# Patient Record
Sex: FEMALE | Race: WHITE | NOT HISPANIC OR LATINO | URBAN - METROPOLITAN AREA
[De-identification: names, ages, dates, MRNs, and addresses within clinical notes are randomized per-mention and may not be internally consistent; named-entity substitution may affect disease eponyms.]

---

## 2017-03-22 ENCOUNTER — EMERGENCY (EMERGENCY)
Facility: HOSPITAL | Age: 82
LOS: 1 days | Discharge: HOME | End: 2017-03-22
Attending: EMERGENCY MEDICINE | Admitting: EMERGENCY MEDICINE
Payer: MEDICARE

## 2017-03-22 VITALS
RESPIRATION RATE: 20 BRPM | SYSTOLIC BLOOD PRESSURE: 150 MMHG | TEMPERATURE: 97 F | HEART RATE: 81 BPM | OXYGEN SATURATION: 95 % | DIASTOLIC BLOOD PRESSURE: 88 MMHG

## 2017-03-22 VITALS
OXYGEN SATURATION: 95 % | HEART RATE: 74 BPM | SYSTOLIC BLOOD PRESSURE: 152 MMHG | TEMPERATURE: 97 F | DIASTOLIC BLOOD PRESSURE: 99 MMHG | RESPIRATION RATE: 22 BRPM

## 2017-03-22 DIAGNOSIS — I63.9 CEREBRAL INFARCTION, UNSPECIFIED: ICD-10-CM

## 2017-03-22 DIAGNOSIS — Z87.891 PERSONAL HISTORY OF NICOTINE DEPENDENCE: ICD-10-CM

## 2017-03-22 DIAGNOSIS — Z95.1 PRESENCE OF AORTOCORONARY BYPASS GRAFT: Chronic | ICD-10-CM

## 2017-03-22 DIAGNOSIS — R53.1 WEAKNESS: ICD-10-CM

## 2017-03-22 DIAGNOSIS — I25.10 ATHEROSCLEROTIC HEART DISEASE OF NATIVE CORONARY ARTERY WITHOUT ANGINA PECTORIS: ICD-10-CM

## 2017-03-22 PROCEDURE — 81003 URINALYSIS AUTO W/O SCOPE: CPT

## 2017-03-22 PROCEDURE — 84484 ASSAY OF TROPONIN QUANT: CPT

## 2017-03-22 PROCEDURE — 36415 COLL VENOUS BLD VENIPUNCTURE: CPT

## 2017-03-22 PROCEDURE — 70450 CT HEAD/BRAIN W/O DYE: CPT

## 2017-03-22 PROCEDURE — 81001 URINALYSIS AUTO W/SCOPE: CPT

## 2017-03-22 PROCEDURE — 70496 CT ANGIOGRAPHY HEAD: CPT

## 2017-03-22 PROCEDURE — 70498 CT ANGIOGRAPHY NECK: CPT | Mod: 26

## 2017-03-22 PROCEDURE — 70450 CT HEAD/BRAIN W/O DYE: CPT | Mod: 26

## 2017-03-22 PROCEDURE — 80053 COMPREHEN METABOLIC PANEL: CPT

## 2017-03-22 PROCEDURE — 99284 EMERGENCY DEPT VISIT MOD MDM: CPT | Mod: 25

## 2017-03-22 PROCEDURE — 99284 EMERGENCY DEPT VISIT MOD MDM: CPT

## 2017-03-22 PROCEDURE — 85025 COMPLETE CBC W/AUTO DIFF WBC: CPT

## 2017-03-22 PROCEDURE — 70496 CT ANGIOGRAPHY HEAD: CPT | Mod: 26,59

## 2017-03-22 PROCEDURE — 70498 CT ANGIOGRAPHY NECK: CPT

## 2017-03-22 PROCEDURE — 93005 ELECTROCARDIOGRAM TRACING: CPT

## 2017-03-22 PROCEDURE — 87086 URINE CULTURE/COLONY COUNT: CPT

## 2017-03-22 PROCEDURE — 93010 ELECTROCARDIOGRAM REPORT: CPT

## 2017-03-22 PROCEDURE — 96374 THER/PROPH/DIAG INJ IV PUSH: CPT

## 2017-03-22 RX ORDER — CEFTRIAXONE 500 MG/1
1 INJECTION, POWDER, FOR SOLUTION INTRAMUSCULAR; INTRAVENOUS ONCE
Qty: 0 | Refills: 0 | Status: COMPLETED | OUTPATIENT
Start: 2017-03-22 | End: 2017-03-22

## 2017-03-22 RX ORDER — CEFUROXIME AXETIL 250 MG
250 TABLET ORAL
Qty: 3500 | Refills: 0 | OUTPATIENT
Start: 2017-03-22 | End: 2017-03-29

## 2017-03-22 RX ADMIN — CEFTRIAXONE 100 GRAM(S): 500 INJECTION, POWDER, FOR SOLUTION INTRAMUSCULAR; INTRAVENOUS at 17:34

## 2017-03-22 NOTE — ED ADULT NURSE NOTE - OBJECTIVE STATEMENT
pt complains of weakness and trouble producing speech yesterday afternoon. pt denies discomfort at present. pt pending providers eval. safety maintained.

## 2017-03-22 NOTE — ED PROVIDER NOTE - OBJECTIVE STATEMENT
88 yo woman with hx of CABG and CVA 9 yrs ago with no residual deficits presents with an episode of aphasia (now resolved) yesterday at 3pm and left-sided arm tingling and weakness since that time. Pt was visiting  at [place] when she had an episode of "not being able to get the words out". On further questioning, appeared to be aphasic and not dysarthric. " I knew what I wanted to say but couldn't think of the words." Issue lasted for unclear amount of time, but per patient 5 or 10 minutes; it resolved completely. However, pt describes a left arm weakness and tingling since along with a "blurriness behind" her left eye. Of note, pt reports that for at least a year she has had these left-sided symptoms along with a vague sensintermittently. Today, the symptoms are more pronounce 86 yo woman with hx of CABG and CVA 9 yrs ago with no residual deficits presents with an episode of aphasia (now resolved) yesterday at 3pm and left-sided arm tingling and weakness since that time. Pt was visiting  at [place] when she had an episode of "not being able to get the words out". On further questioning, appeared to be aphasic and not dysarthric. " I knew what I wanted to say but couldn't think of the words." Issue lasted for unclear amount of time, but per patient 5 or 10 minutes; it resolved completely. However, pt describes a left arm weakness and tingling since along with a "blurriness behind" her left eye. Of note, pt reports that for at least a year she has intermittently had these left-sided symptoms along with a vague sensation/pressure in her head. Today, the symptoms are more pronounced and not going away. Pt denies confusion, dizziness, lightheadedness, dysphagia, dysarthria, F/C, N/V, CP, SOB, Abd pain, change in bowels. Pt does report an itchiness/pain in her genital area for last few days. Pt had a "cold" 2 weeks ago and finished 10 days of amoxicillin 2 days ago, with resolution of symptoms. Her stroke 10 years ago occurred during a hospitalization for CABG; pt was blind in both eyes but symptoms resolved during the hospital stay.

## 2017-03-22 NOTE — ED ADULT NURSE REASSESSMENT NOTE - NS ED NURSE REASSESS COMMENT FT1
back from CT, pending results.
Patient d/c, nurse note incomplete from previous shift.
Received patient from SAMMIE Elmore, nurse note incomplete.

## 2017-03-22 NOTE — ED ADULT TRIAGE NOTE - CHIEF COMPLAINT QUOTE
patient reports "yesterday when I went to visit my  I couldn't talk and express what I wanted to say and since then my whole left side has been weak and my left eye is bothering me." Left hand  noted to be weaker than right side. Speech is coherent and speaking in full sentences.

## 2017-03-22 NOTE — ED PROVIDER NOTE - ATTENDING CONTRIBUTION TO CARE
86 yo female h/o cad s/p cabg, cva w/o residual deficits c/o short episode of expressive aphasia 3 d ago and feeling mild LUE numbness/weakness w/ mild ha and possible blurred vision.  No fever, uri sx, head trauma, cp, palpitations, sob, n/v.  No change in speech now, no difficulty walking.  H/o similar LUE sx in the past.  Well appearing, nc/at, eomi, perrl, op benign, mmm, lung cta, heart reg, abd soft/nt, ext no c/c/e, cn grossly intact, motor 5/5, no gross sens deficits, no pronator drift, fnf intact, nl gait, neg romberg.  Stroke consulted for ? subacute tia vs cva - ct head neg, labs/ekg wnl except for uti.  Stroke team felt sx likely exacerbation of old stroke 2/2 infection w uti and recommend cta head/neck - cleared for dc and outpt neuro f/u if neg.  CTA neg.  Pt dc to f/u pmd, neuro w rx for uti.

## 2017-03-22 NOTE — ED PROVIDER NOTE - MEDICAL DECISION MAKING DETAILS
After consultation with Stroke Team, symptoms likely recurrence of old stroke symptoms in the setting of recent URI w/ fevers and current UTI. Due to the nature of the symptoms and with negative imaging, new CVA is unlikely. Pt already has plan to follow up with Dr. Calero from Stroke Team.

## 2017-03-22 NOTE — CONSULT NOTE ADULT - SUBJECTIVE AND OBJECTIVE BOX
Vascular Neurology Consultation    Reason for consult: transient expressive aphasia yesterday and left arm/leg weakness    HPI:      PAST MEDICAL & SURGICAL HISTORY:  Stroke  CAD (coronary artery disease)  S/P CABG (coronary artery bypass graft)    FAMILY HISTORY:  No pertinent family history    Social History: Lives alone.  currently a resident of [facility]. Does not require assistive devices to ambulate. Independent of all ADLs but does require some help with heavier chores at home. Does not have an aid, but daughter comes frequently to assist. An active munoz. Former smoker, quit 35-40 years ago, smoked approximately 2 ppd x 20 years. Social ETOH use- approximately 3-4 drinks a week. No illicit drug use.     Home meds:   Alprazolam   Plavix   Aspirin 81 mg daily  Metoprolol   Protonix  Atorvastatin  Fenofibrate   Reports compliance with meds     Review of Systems:  Constitutional: No fever, weight loss or fatigue  Eyes: No eye pain or discharge  ENMT:  No difficulty hearing, tinnitus, vertigo; No sinus or throat pain  Neck: Chronic left and right neck pain, intermittent   Respiratory: No cough, wheezing, chills or hemoptysis. Recent URI as in HPI   Cardiovascular: No chest pain, dizziness or leg swelling. Intermittent palpitations and shortness of breath, often related to anxiety, resolves with rest and alprazolam.   Gastrointestinal: No abdominal pain. No nausea, vomiting or hematemesis; No diarrhea or constipation.  Genitourinary: No dysuria, frequency, hematuria or incontinence  Neurological: As per HPI  Skin: No itching, burning, rashes or lesions   Endocrine: No heat or cold intolerance; No hair loss  Musculoskeletal: No joint pain or swelling  Psychiatric: No depression, mood swings or difficulty sleeping  Heme/Lymph: No easy bruising or bleeding gums    MEDICATIONS  (STANDING):    MEDICATIONS  (PRN):      Allergies: No Known Allergies    Vital Signs Last 24 Hrs  T(C): 36.2, Max: 36.2 (03-22 @ 13:25)  T(F): 97.1, Max: 97.1 (03-22 @ 13:25)  HR: 74 (74 - 74)  BP: 152/99 (152/99 - 152/99)  RR: 22 (22 - 22)  SpO2: 95% (95% - 95%)    Gen: No acute distress, well-nourished, calm, cooperative, pleasant  Neuro:  Mental status: Awake, alert and oriented x3.  Recent and remote memory intact.  Naming, repetition and comprehension intact.  Attention/concentration intact.  No dysarthria, no aphasia.  Fund of knowledge appropriate.    Cranial nerves:  pupils equally round and sluggishly reactive to light, 2 mm, visual fields full, no nystagmus, no ptosis, extraocular muscles intact, V1 through V3 intact bilaterally and symmetric, mild flattening of left nasolabial fold, chronic hard of hearing-wears hearing aids, palate elevation symmetric, tongue was midline, sternocleidomastoid/shoulder shrug strength bilaterally 5/5.    Motor: No upper or lower extremity drift. Normal bulk and tone. Bilateral  strength equally strong. RUE 5/5. LUE 5/5. RLE 5/5. LLE 5/5. Rapid alternating movements intact and symmetric.   Sensation: Intact to light touch, proprioception. Decreased vibration on RUE and LLE. Decreased temperature RUE and LLE.  No neglect.   Coordination: No dysmetria on finger-to-nose and heel-to-shin.  No clumsiness.  Reflexes: 2+ in upper and lower extremities, absent Babinski bilaterally  Gait: Narrow and steady. No ataxia.  Romberg negative    NIHSS: 2 (1-facial asymmetry, 1-mild sensory loss)     Labs:                        13.7   5.9   )-----------( 276      ( 22 Mar 2017 14:40 )             40.4     22 Mar 2017 14:40    141    |  108    |  21     ----------------------------<  81     4.2     |  23     |  0.70     Ca    8.9        22 Mar 2017 14:40    TPro  7.0    /  Alb  3.4    /  TBili  0.3    /  DBili  x      /  AST  15     /  ALT  21     /  AlkPhos  107    22 Mar 2017 14:40      Radiology and Additional Studies:  3/22 CT head:   IMPRESSION:  Remote left occipital infarct. No evidence of acute infarct.    Assessment & Plan: Vascular Neurology Consultation    Reason for consult: transient expressive aphasia yesterday and left arm/leg weakness    HPI: 87 year old right handed woman with PMH of anxiety, CAD, CABG 9 years ago, CVA post-CABG, cervical and spinal stenosis, recent URI w/ fevers 2 weeks ago s/p 10 days of Amoxicillin, presents today with transient expressive aphasia yesterday and left arm/leg weakness. Of note, old CVA presentation consisted of bilateral eye blindness (resolved after 1 day, resolved during her hospital admission) and left arm/leg weakness. Since then, pt has had intermittent left arm and leg weakness and also with intermittent left eye blurry vision. Was seen by multiple opthalmology specialist with normal exams. Yesterday, pt was in [facility], was visiting her  at [facility] at approximately 2-3 pm, was in the midst of talking to her  and suddenly had trouble getting words out. Knew what she wanted to say, but had difficulty expressing herself. Lasted approximately 5 minutes and then resolved on its own. At the time, pt also noted her left arm/leg weakness returned-weakness similar to her previous episodes. Also with intermittent left eye blurry vision-similar to previous episodes as well. However, previous episodes did not involve aphasia or slurred speech. Daughter reports pt had similar episode last week, but when asked pt to clarify symptoms last week, pt states she does not recall. Decided to come into [facility] ED to get evaluated today under persuasion by her daughter given new onset of transient expressive aphasia. Pt denies dizziness, confusion, headache, numbness, right sided weakness, gait imbalance at the time.     Reports recent vaginal itching and "feeling swollen down there." No dysuria, no frequency, no hesitancy. Denies being sexually active. Itching after she started her antibiotics for her recent URI.     Has had chronic neck pain, more prominent on left than right. Has had imaging before which revealed cervical and spinal stenosis. Currently with mild left sided neck pain for which pt states is the same characteristics as her prior pain episodes.     Upon arrival to ED, VS : /99, HR 74, Temp 97.1, RR 22, SpO2 95%. UA positive for UTI. Given Ceftriaxone 1 gm in the ED.     PAST MEDICAL & SURGICAL HISTORY:  Anxiety  Stroke post-CABG  CAD (coronary artery disease)  S/P CABG (coronary artery bypass graft)  Cervical and spinal stenosis     FAMILY HISTORY:  No pertinent family history    Social History: Lives alone. 94 year old  is a long term resident of [facility]-pt states she visits him frequently but he gives her a lot of stress. Does not require assistive devices to ambulate. Independent of all ADLs but does require some help with heavier chores at home. Does not have an aid, but daughter comes frequently to assist. 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 Former smoker, quit 35-40 years ago, smoked approximately 2 ppd x 20 years. Social ETOH use- approximately 3-4 drinks a week. No illicit drug use.     Home meds:   Alprazolam (unknown dose)  Plavix 75 mg daily  Aspirin 81 mg daily  Metoprolol (unknown dose)  Protonix (unknown dose)  Atorvastatin (unknown dose)  Fenofibrate (unknown dose)   Reports compliance with meds     Review of Systems:  Constitutional: No fever, weight loss or fatigue  Eyes: No eye pain or discharge  ENMT:  No difficulty hearing, tinnitus, vertigo; No sinus or throat pain  Neck: Chronic left and right neck pain, intermittent   Respiratory: No cough, wheezing, chills or hemoptysis. Recent URI as in HPI   Cardiovascular: No chest pain, dizziness or leg swelling. Intermittent palpitations and shortness of breath, often related to anxiety, resolves with rest and alprazolam.   Gastrointestinal: No abdominal pain. No nausea, vomiting or hematemesis; No diarrhea or constipation.  Genitourinary: No dysuria, frequency, hematuria or incontinence  Neurological: As per HPI  Skin: No itching, burning, rashes or lesions   Endocrine: No heat or cold intolerance; No hair loss  Musculoskeletal: No joint pain or swelling  Psychiatric: No depression, mood swings or difficulty sleeping  Heme/Lymph: No easy bruising or bleeding gums    MEDICATIONS  (STANDING):    MEDICATIONS  (PRN):      Allergies: No Known Allergies    Vital Signs Last 24 Hrs  T(C): 36.2, Max: 36.2 (03-22 @ 13:25)  T(F): 97.1, Max: 97.1 (03-22 @ 13:25)  HR: 74 (74 - 74)  BP: 152/99 (152/99 - 152/99)  RR: 22 (22 - 22)  SpO2: 95% (95% - 95%)    Gen: No acute distress, well-nourished, calm, cooperative, pleasant  Neuro:  Mental status: Awake, alert and oriented x3.  Recent and remote memory intact. Word recall 3/3. Naming, repetition and comprehension intact.  Attention/concentration intact.  No dysarthria, no aphasia.  Fund of knowledge appropriate.    Cranial nerves:  pupils equally round and sluggishly reactive to light, 2 mm, visual fields full, no nystagmus, no ptosis, extraocular muscles intact, V1 through V3 intact bilaterally and symmetric, mild flattening of left nasolabial fold (daughter states this is pt's baseline), chronic hard of hearing-wears hearing aids, palate elevation symmetric, tongue was midline, sternocleidomastoid/shoulder shrug strength bilaterally 5/5.    Motor: No upper or lower extremity drift. Normal bulk and tone. Bilateral  strength equally strong. RUE 5/5. LUE 5/5. RLE 5/5. LLE 5/5. Rapid alternating movements intact and symmetric.   Sensation: Decreased light touch, pinprick, vibration and temperature on LUE and LLE (pt reports it is chronic).  No neglect.   Coordination: No dysmetria on finger-to-nose and heel-to-shin.  No clumsiness.  Reflexes: 2+ in upper and lower extremities, absent Babinski bilaterally  Gait: Narrow and steady. No ataxia.  Romberg negative    NIHSS: 2 (1-facial asymmetry which is chronic as per daughter, 1-mild sensory loss which is chronic as per pt)   Not a tpa candidate given resolution of acute symptoms; above symptoms are chronic as per pt and daughter    Labs:                        13.7   5.9   )-----------( 276      ( 22 Mar 2017 14:40 )             40.4     22 Mar 2017 14:40    141    |  108    |  21     ----------------------------<  81     4.2     |  23     |  0.70     Ca    8.9        22 Mar 2017 14:40    TPro  7.0    /  Alb  3.4    /  TBili  0.3    /  DBili  x      /  AST  15     /  ALT  21     /  AlkPhos  107    22 Mar 2017 14:40      Radiology and Additional Studies:  3/22 CT head:   IMPRESSION:  Remote left occipital infarct. No evidence of acute infarct.    Assessment & Plan:  87 year old right handed woman with PMH of anxiety, CAD, CABG 9 years ago, CVA post-CABG (with intermittent left arm/leg weakness and left eye blurriness), cervical and spinal stenosis, recent URI w/ fevers 2 weeks ago s/p 10 days of Amoxicillin, presents today with new onset of transient expressive aphasia that occurred yesterday. Found to have UTI.     -CT head with chronic left occipital infarct  -would check CTA head/neck w/ IV contrast to assess for stenosis or occlusion  -if CTA unremarkable, ok to discharge home with follow up with Dr. Calero in 1 week as an outpatient for routine neurological care; pt reports she has not followed up with any neurologist after her CVA 9 years ago  -likely recurrence of old stroke symptoms in the setting of recent URI w/ fevers, recent stress from her , and current UTI   -discharge on antibiotics for UTI  -continue all current home meds with exception of protonix; counseled pt and daughter that protonix interacts with plavix and should be avoided; should also avoid nexium. Ok to take OTC pepcid or zantac. Pt reports understanding  -pt advised to return to the ED if another episode of expressive aphasia or slurred speech Vascular Neurology Consultation    Reason for consult: transient expressive aphasia yesterday and left arm/leg weakness    HPI: 87 year old right handed woman with PMH of anxiety, CAD, CABG 9 years ago, CVA post-CABG, cervical and spinal stenosis, recent URI w/ fevers 2 weeks ago s/p 10 days of Amoxicillin, presents today with transient expressive aphasia yesterday and left arm/leg weakness. Of note, old CVA presentation consisted of bilateral eye blindness (resolved after 1 day, resolved during her hospital admission) and left arm/leg weakness. Since then, pt has had intermittent left arm and leg weakness and also with intermittent left eye blurry vision. Was seen by multiple opthalmology specialist with normal exams. Yesterday, pt was in [facility] [place], was visiting her  at [facility] at approximately 2-3 pm, was in the midst of talking to her  and suddenly had trouble getting words out. Knew what she wanted to say, but had difficulty expressing herself. Lasted approximately 5 minutes and then resolved on its own. At the time, pt also noted her left arm/leg weakness returned-weakness similar to her previous episodes. Also with intermittent left eye blurry vision-similar to previous episodes as well. However, previous episodes did not involve aphasia or slurred speech. Daughter reports pt had similar episode last week, but when asked pt to clarify symptoms last week, pt states she does not recall. Decided to come into [facility] ED to get evaluated today under persuasion by her daughter given new onset of transient expressive aphasia. Pt denies dizziness, confusion, headache, numbness, right sided weakness, gait imbalance at the time.     Reports recent vaginal itching and "feeling swollen down there." No dysuria, no frequency, no hesitancy. Denies being sexually active. Itching after she started her antibiotics for her recent URI.     Has had chronic neck pain, more prominent on left than right. Has had imaging before which revealed cervical and spinal stenosis. Currently with mild left sided neck pain for which pt states is the same characteristics as her prior pain episodes.     Upon arrival to ED, VS : /99, HR 74, Temp 97.1, RR 22, SpO2 95%. UA positive for UTI. Given Ceftriaxone 1 gm in the ED.     PAST MEDICAL & SURGICAL HISTORY:  Anxiety  Stroke post-CABG  CAD (coronary artery disease)  S/P CABG (coronary artery bypass graft)  Cervical and spinal stenosis     FAMILY HISTORY:  No pertinent family history    Social History: Lives alone. 94 year old  is a long term resident of [facility]-pt states she visits him frequently but he gives her a lot of stress. Does not require assistive devices to ambulate. Independent of all ADLs but does require some help with heavier chores at home. Does not have an aid, but daughter comes frequently to assist. 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 Former smoker, quit 35-40 years ago, smoked approximately 2 ppd x 20 years. Social ETOH use- approximately 3-4 drinks a week. No illicit drug use.     Home meds:   Alprazolam (unknown dose)  Plavix 75 mg daily  Aspirin 81 mg daily  Metoprolol (unknown dose)  Protonix (unknown dose)  Atorvastatin (unknown dose)  Fenofibrate (unknown dose)   Reports compliance with meds     Review of Systems:  Constitutional: No fever, weight loss or fatigue  Eyes: No eye pain or discharge  ENMT:  No difficulty hearing, tinnitus, vertigo; No sinus or throat pain  Neck: Chronic left and right neck pain, intermittent   Respiratory: No cough, wheezing, chills or hemoptysis. Recent URI as in HPI   Cardiovascular: No chest pain, dizziness or leg swelling. Intermittent palpitations and shortness of breath, often related to anxiety, resolves with rest and alprazolam.   Gastrointestinal: No abdominal pain. No nausea, vomiting or hematemesis; No diarrhea or constipation.  Genitourinary: No dysuria, frequency, hematuria or incontinence  Neurological: As per HPI  Skin: No itching, burning, rashes or lesions   Endocrine: No heat or cold intolerance; No hair loss  Musculoskeletal: No joint pain or swelling  Psychiatric: No depression, mood swings or difficulty sleeping  Heme/Lymph: No easy bruising or bleeding gums    MEDICATIONS  (STANDING):    MEDICATIONS  (PRN):      Allergies: No Known Allergies    Vital Signs Last 24 Hrs  T(C): 36.2, Max: 36.2 (03-22 @ 13:25)  T(F): 97.1, Max: 97.1 (03-22 @ 13:25)  HR: 74 (74 - 74)  BP: 152/99 (152/99 - 152/99)  RR: 22 (22 - 22)  SpO2: 95% (95% - 95%)    Gen: No acute distress, well-nourished, calm, cooperative, pleasant  Neuro:  Mental status: Awake, alert and oriented x3.  Recent and remote memory intact. Word recall 3/3. Naming, repetition and comprehension intact.  Attention/concentration intact.  No dysarthria, no aphasia.  Fund of knowledge appropriate.    Cranial nerves:  pupils equally round and sluggishly reactive to light, 2 mm, visual fields full, no nystagmus, no ptosis, extraocular muscles intact, V1 through V3 intact bilaterally and symmetric, mild flattening of left nasolabial fold (daughter states this is pt's baseline), chronic hard of hearing-wears hearing aids, palate elevation symmetric, tongue was midline, sternocleidomastoid/shoulder shrug strength bilaterally 5/5.    Motor: No upper or lower extremity drift. Normal bulk and tone. Bilateral  strength equally strong. RUE 5/5. LUE 5/5. RLE 5/5. LLE 5/5. Rapid alternating movements intact and symmetric.   Sensation: Decreased light touch, pinprick, vibration and temperature on LUE and LLE (pt reports it is chronic).  No neglect.   Coordination: No dysmetria on finger-to-nose and heel-to-shin.  No clumsiness.  Reflexes: 2+ in upper and lower extremities, absent Babinski bilaterally  Gait: Narrow and steady. No ataxia.  Romberg negative    NIHSS: 2 (1-facial asymmetry which is chronic as per daughter, 1-mild sensory loss which is chronic as per pt)   Not a tpa candidate given resolution of acute symptoms; above symptoms are chronic as per pt and daughter    Labs:                        13.7   5.9   )-----------( 276      ( 22 Mar 2017 14:40 )             40.4     22 Mar 2017 14:40    141    |  108    |  21     ----------------------------<  81     4.2     |  23     |  0.70     Ca    8.9        22 Mar 2017 14:40    TPro  7.0    /  Alb  3.4    /  TBili  0.3    /  DBili  x      /  AST  15     /  ALT  21     /  AlkPhos  107    22 Mar 2017 14:40      Radiology and Additional Studies:  3/22 CT head:   IMPRESSION:  Remote left occipital infarct. No evidence of acute infarct.    Assessment & Plan:  87 year old right handed woman with PMH of anxiety, CAD, CABG 9 years ago, CVA post-CABG (with intermittent left arm/leg weakness and left eye blurriness), cervical and spinal stenosis, recent URI w/ fevers 2 weeks ago s/p 10 days of Amoxicillin, presents today with new onset of transient expressive aphasia that occurred yesterday.  Also complains of persistent left sided weakness similar to past episodes though no objective weakness on exam.  Found to have UTI.     -CT head with chronic left occipital infarct  -would check CTA head/neck w/ IV contrast to assess for stenosis or occlusion  -if CTA with no significant findings, ok to discharge home with follow up with Dr. Calero in 1 week as an outpatient for routine neurological care; pt reports she has not followed up with any neurologist after her CVA 9 years ago  -likely recurrence of old stroke symptoms in the setting of recent URI w/ fevers, recent stress from her , and current UTI   -discharge on antibiotics for UTI  -continue all current home meds.  Stop nexium since also taking clopidogrel.  Ok to take OTC pepcid or zantac. Pt reports understanding  -pt advised to return to the ED if another episode of expressive aphasia or slurred speech

## 2017-03-23 RX ORDER — CEFUROXIME AXETIL 250 MG
1 TABLET ORAL
Qty: 0 | Refills: 0 | COMMUNITY
Start: 2017-03-23 | End: 2017-03-29

## 2017-12-05 PROBLEM — Z00.00 ENCOUNTER FOR PREVENTIVE HEALTH EXAMINATION: Status: ACTIVE | Noted: 2017-12-05

## 2018-01-10 ENCOUNTER — APPOINTMENT (OUTPATIENT)
Dept: OPHTHALMOLOGY | Facility: CLINIC | Age: 83
End: 2018-01-10
Payer: MEDICARE

## 2018-01-10 PROCEDURE — 92014 COMPRE OPH EXAM EST PT 1/>: CPT

## 2018-05-11 NOTE — ED ADULT NURSE REASSESSMENT NOTE - RESPIRATORY WDL
Breathing spontaneous and unlabored. Breath sounds clear and equal bilaterally with regular rhythm. no

## 2018-06-18 ENCOUNTER — EMERGENCY (EMERGENCY)
Facility: HOSPITAL | Age: 83
LOS: 1 days | Discharge: HOME | End: 2018-06-18
Attending: EMERGENCY MEDICINE | Admitting: EMERGENCY MEDICINE
Payer: MEDICARE

## 2018-06-18 VITALS
RESPIRATION RATE: 22 BRPM | TEMPERATURE: 98 F | DIASTOLIC BLOOD PRESSURE: 63 MMHG | HEART RATE: 81 BPM | SYSTOLIC BLOOD PRESSURE: 97 MMHG | WEIGHT: 129.19 LBS | OXYGEN SATURATION: 96 %

## 2018-06-18 VITALS
SYSTOLIC BLOOD PRESSURE: 135 MMHG | RESPIRATION RATE: 22 BRPM | DIASTOLIC BLOOD PRESSURE: 65 MMHG | HEART RATE: 60 BPM | OXYGEN SATURATION: 98 %

## 2018-06-18 DIAGNOSIS — E78.5 HYPERLIPIDEMIA, UNSPECIFIED: ICD-10-CM

## 2018-06-18 DIAGNOSIS — I25.10 ATHEROSCLEROTIC HEART DISEASE OF NATIVE CORONARY ARTERY WITHOUT ANGINA PECTORIS: ICD-10-CM

## 2018-06-18 DIAGNOSIS — Z79.2 LONG TERM (CURRENT) USE OF ANTIBIOTICS: ICD-10-CM

## 2018-06-18 DIAGNOSIS — Z95.1 PRESENCE OF AORTOCORONARY BYPASS GRAFT: Chronic | ICD-10-CM

## 2018-06-18 DIAGNOSIS — I10 ESSENTIAL (PRIMARY) HYPERTENSION: ICD-10-CM

## 2018-06-18 DIAGNOSIS — R07.9 CHEST PAIN, UNSPECIFIED: ICD-10-CM

## 2018-06-18 DIAGNOSIS — R07.89 OTHER CHEST PAIN: ICD-10-CM

## 2018-06-18 LAB
ALBUMIN SERPL ELPH-MCNC: 3.8 G/DL — SIGNIFICANT CHANGE UP (ref 3.3–5)
ALP SERPL-CCNC: 95 U/L — SIGNIFICANT CHANGE UP (ref 40–120)
ALT FLD-CCNC: 22 U/L — SIGNIFICANT CHANGE UP (ref 10–45)
ANION GAP SERPL CALC-SCNC: 14 MMOL/L — SIGNIFICANT CHANGE UP (ref 5–17)
AST SERPL-CCNC: 24 U/L — SIGNIFICANT CHANGE UP (ref 10–40)
BASOPHILS NFR BLD AUTO: 0.4 % — SIGNIFICANT CHANGE UP (ref 0–2)
BILIRUB SERPL-MCNC: 0.4 MG/DL — SIGNIFICANT CHANGE UP (ref 0.2–1.2)
BUN SERPL-MCNC: 20 MG/DL — SIGNIFICANT CHANGE UP (ref 7–23)
CALCIUM SERPL-MCNC: 8.9 MG/DL — SIGNIFICANT CHANGE UP (ref 8.4–10.5)
CHLORIDE SERPL-SCNC: 106 MMOL/L — SIGNIFICANT CHANGE UP (ref 96–108)
CK MB CFR SERPL CALC: 2 NG/ML — SIGNIFICANT CHANGE UP (ref 0–6.7)
CK SERPL-CCNC: 88 U/L — SIGNIFICANT CHANGE UP (ref 25–170)
CO2 SERPL-SCNC: 23 MMOL/L — SIGNIFICANT CHANGE UP (ref 22–31)
CREAT SERPL-MCNC: 0.75 MG/DL — SIGNIFICANT CHANGE UP (ref 0.5–1.3)
EOSINOPHIL NFR BLD AUTO: 4 % — SIGNIFICANT CHANGE UP (ref 0–6)
GLUCOSE SERPL-MCNC: 109 MG/DL — HIGH (ref 70–99)
HCT VFR BLD CALC: 40 % — SIGNIFICANT CHANGE UP (ref 34.5–45)
HGB BLD-MCNC: 13.1 G/DL — SIGNIFICANT CHANGE UP (ref 11.5–15.5)
LYMPHOCYTES # BLD AUTO: 30.8 % — SIGNIFICANT CHANGE UP (ref 13–44)
MCHC RBC-ENTMCNC: 31.2 PG — SIGNIFICANT CHANGE UP (ref 27–34)
MCHC RBC-ENTMCNC: 32.8 G/DL — SIGNIFICANT CHANGE UP (ref 32–36)
MCV RBC AUTO: 95.2 FL — SIGNIFICANT CHANGE UP (ref 80–100)
MONOCYTES NFR BLD AUTO: 10.3 % — SIGNIFICANT CHANGE UP (ref 2–14)
NEUTROPHILS NFR BLD AUTO: 54.5 % — SIGNIFICANT CHANGE UP (ref 43–77)
PLATELET # BLD AUTO: 258 K/UL — SIGNIFICANT CHANGE UP (ref 150–400)
POTASSIUM SERPL-MCNC: 4.3 MMOL/L — SIGNIFICANT CHANGE UP (ref 3.5–5.3)
POTASSIUM SERPL-SCNC: 4.3 MMOL/L — SIGNIFICANT CHANGE UP (ref 3.5–5.3)
PROT SERPL-MCNC: 6.6 G/DL — SIGNIFICANT CHANGE UP (ref 6–8.3)
RBC # BLD: 4.2 M/UL — SIGNIFICANT CHANGE UP (ref 3.8–5.2)
RBC # FLD: 14.3 % — SIGNIFICANT CHANGE UP (ref 10.3–16.9)
SODIUM SERPL-SCNC: 143 MMOL/L — SIGNIFICANT CHANGE UP (ref 135–145)
TROPONIN T SERPL-MCNC: <0.01 NG/ML — SIGNIFICANT CHANGE UP (ref 0–0.01)
TROPONIN T SERPL-MCNC: <0.01 NG/ML — SIGNIFICANT CHANGE UP (ref 0–0.01)
WBC # BLD: 6.7 K/UL — SIGNIFICANT CHANGE UP (ref 3.8–10.5)
WBC # FLD AUTO: 6.7 K/UL — SIGNIFICANT CHANGE UP (ref 3.8–10.5)

## 2018-06-18 PROCEDURE — 36415 COLL VENOUS BLD VENIPUNCTURE: CPT

## 2018-06-18 PROCEDURE — 84484 ASSAY OF TROPONIN QUANT: CPT

## 2018-06-18 PROCEDURE — 93010 ELECTROCARDIOGRAM REPORT: CPT

## 2018-06-18 PROCEDURE — 82553 CREATINE MB FRACTION: CPT

## 2018-06-18 PROCEDURE — 99285 EMERGENCY DEPT VISIT HI MDM: CPT | Mod: 25

## 2018-06-18 PROCEDURE — 71046 X-RAY EXAM CHEST 2 VIEWS: CPT | Mod: 26

## 2018-06-18 PROCEDURE — 93005 ELECTROCARDIOGRAM TRACING: CPT

## 2018-06-18 PROCEDURE — 71046 X-RAY EXAM CHEST 2 VIEWS: CPT

## 2018-06-18 PROCEDURE — 80053 COMPREHEN METABOLIC PANEL: CPT

## 2018-06-18 PROCEDURE — 85025 COMPLETE CBC W/AUTO DIFF WBC: CPT

## 2018-06-18 PROCEDURE — 99283 EMERGENCY DEPT VISIT LOW MDM: CPT | Mod: 25

## 2018-06-18 PROCEDURE — 82550 ASSAY OF CK (CPK): CPT

## 2018-06-18 RX ORDER — OXYCODONE AND ACETAMINOPHEN 5; 325 MG/1; MG/1
1 TABLET ORAL ONCE
Qty: 0 | Refills: 0 | Status: DISCONTINUED | OUTPATIENT
Start: 2018-06-18 | End: 2018-06-18

## 2018-06-18 RX ORDER — ASPIRIN/CALCIUM CARB/MAGNESIUM 324 MG
325 TABLET ORAL ONCE
Qty: 0 | Refills: 0 | Status: COMPLETED | OUTPATIENT
Start: 2018-06-18 | End: 2018-06-18

## 2018-06-18 RX ADMIN — OXYCODONE AND ACETAMINOPHEN 1 TABLET(S): 5; 325 TABLET ORAL at 16:41

## 2018-06-18 NOTE — ED PROVIDER NOTE - OBJECTIVE STATEMENT
88 y/o f hx HTN, HLD, CAD s/p CABG 10 yrs ago presents c/o several episodes of chest tightness this week.  Pt stating episodes occurring at rest, had another this morning.  Pt stating there is no pain, stating the feeling is difficult to describe although not the same as when she had CABG previously. 89 y/o f hx HTN, HLD, CAD s/p CABG 10 yrs ago presents c/o several episodes of chest tightness this week.  Pt stating episodes occurring at rest, had another this morning.  Pt stating there is no pain, stating the feeling is difficult to describe although not the same as when she had CABG previously.  Pt stating in ED, having no discomfort.  Pt denies SOB, fever, chills, n/v, all other ROS negative.

## 2018-06-18 NOTE — ED PROVIDER NOTE - ATTENDING CONTRIBUTION TO CARE
88 y/o f hx HTN, HLD, CAD s/p CABG 10 yrs ago presents c/o several episodes of intermittent chest tightness this week. No CP in ED currently. No abd pain, back pain, cough. No SOB. Pt AAO, NAD, RRR, CTA b/l, abd: soft/NT. EKG unchanged and nonischemic, CXR negative and trop neg x 2.  Discussed with cardiology on call Dr. Koroma, since pt asymptomatic and neg trop and no EKG changes, will have her f/u in office tomorrow.  Pt agrees with plan, if sx return prior to her f/u, she is advised to return to ED. Stable for dc.

## 2018-06-18 NOTE — ED PROVIDER NOTE - MEDICAL DECISION MAKING DETAILS
88 y/o f hx HTN, HLD, CAD s/p CABG 10 yrs ago presents with intermittent chest tightness x 1 week; asymptomatic in ED, EKG unchanged and nonischemic, cxr negative, trop neg x 2.  Discussed with cardiology on call Dr. Koroma, since pt asymptomatic and neg trop and no EKG changes, will have her f/u in office tomorrow.  Pt agrees with plan, if sx return prior to her f/u, she is advised to return to ED.

## 2018-06-27 ENCOUNTER — APPOINTMENT (OUTPATIENT)
Dept: HEART AND VASCULAR | Facility: CLINIC | Age: 83
End: 2018-06-27
Payer: MEDICARE

## 2018-06-27 VITALS
OXYGEN SATURATION: 98 % | HEIGHT: 57.48 IN | TEMPERATURE: 96.9 F | SYSTOLIC BLOOD PRESSURE: 128 MMHG | DIASTOLIC BLOOD PRESSURE: 63 MMHG | WEIGHT: 126.99 LBS | HEART RATE: 55 BPM | BODY MASS INDEX: 27.02 KG/M2

## 2018-06-27 DIAGNOSIS — Z82.3 FAMILY HISTORY OF STROKE: ICD-10-CM

## 2018-06-27 DIAGNOSIS — Z86.39 PERSONAL HISTORY OF OTHER ENDOCRINE, NUTRITIONAL AND METABOLIC DISEASE: ICD-10-CM

## 2018-06-27 DIAGNOSIS — R06.02 SHORTNESS OF BREATH: ICD-10-CM

## 2018-06-27 DIAGNOSIS — R07.9 CHEST PAIN, UNSPECIFIED: ICD-10-CM

## 2018-06-27 DIAGNOSIS — Z87.891 PERSONAL HISTORY OF NICOTINE DEPENDENCE: ICD-10-CM

## 2018-06-27 DIAGNOSIS — Z95.1 PRESENCE OF AORTOCORONARY BYPASS GRAFT: ICD-10-CM

## 2018-06-27 DIAGNOSIS — Z82.49 FAMILY HISTORY OF ISCHEMIC HEART DISEASE AND OTHER DISEASES OF THE CIRCULATORY SYSTEM: ICD-10-CM

## 2018-06-27 DIAGNOSIS — Z80.9 FAMILY HISTORY OF MALIGNANT NEOPLASM, UNSPECIFIED: ICD-10-CM

## 2018-06-27 DIAGNOSIS — Z83.3 FAMILY HISTORY OF DIABETES MELLITUS: ICD-10-CM

## 2018-06-27 DIAGNOSIS — Z86.79 PERSONAL HISTORY OF OTHER DISEASES OF THE CIRCULATORY SYSTEM: ICD-10-CM

## 2018-06-27 DIAGNOSIS — Z83.49 FAMILY HISTORY OF OTHER ENDOCRINE, NUTRITIONAL AND METABOLIC DISEASES: ICD-10-CM

## 2018-06-27 DIAGNOSIS — Z78.9 OTHER SPECIFIED HEALTH STATUS: ICD-10-CM

## 2018-06-27 PROCEDURE — 93000 ELECTROCARDIOGRAM COMPLETE: CPT

## 2018-06-27 PROCEDURE — 99205 OFFICE O/P NEW HI 60 MIN: CPT

## 2018-06-27 RX ORDER — ALPRAZOLAM 2 MG/1
2 TABLET ORAL
Refills: 0 | Status: ACTIVE | COMMUNITY

## 2018-06-27 RX ORDER — HYDROCODONE BITARTRATE AND ACETAMINOPHEN 7.5; 325 MG/1; MG/1
7.5-325 TABLET ORAL
Qty: 30 | Refills: 0 | Status: ACTIVE | COMMUNITY
Start: 2018-06-05

## 2018-06-27 RX ORDER — NITROGLYCERIN 0.4 MG/1
0.4 TABLET SUBLINGUAL
Qty: 100 | Refills: 0 | Status: ACTIVE | COMMUNITY
Start: 2018-06-20

## 2018-06-27 RX ORDER — ATORVASTATIN CALCIUM 80 MG/1
80 TABLET, FILM COATED ORAL
Qty: 90 | Refills: 0 | Status: ACTIVE | COMMUNITY
Start: 2017-07-03

## 2018-06-27 RX ORDER — OSELTAMIVIR PHOSPHATE 75 MG/1
75 CAPSULE ORAL
Qty: 10 | Refills: 0 | Status: ACTIVE | COMMUNITY
Start: 2018-03-12

## 2018-06-27 RX ORDER — METOPROLOL SUCCINATE 25 MG/1
25 TABLET, EXTENDED RELEASE ORAL
Qty: 60 | Refills: 4 | Status: ACTIVE | COMMUNITY

## 2018-06-27 RX ORDER — PANTOPRAZOLE 40 MG/1
40 TABLET, DELAYED RELEASE ORAL
Qty: 90 | Refills: 0 | Status: ACTIVE | COMMUNITY
Start: 2018-01-04

## 2018-06-29 VITALS
TEMPERATURE: 98 F | OXYGEN SATURATION: 97 % | DIASTOLIC BLOOD PRESSURE: 71 MMHG | RESPIRATION RATE: 16 BRPM | SYSTOLIC BLOOD PRESSURE: 149 MMHG | HEART RATE: 71 BPM

## 2018-06-29 RX ORDER — CHLORHEXIDINE GLUCONATE 213 G/1000ML
1 SOLUTION TOPICAL ONCE
Qty: 0 | Refills: 0 | Status: DISCONTINUED | OUTPATIENT
Start: 2018-07-02 | End: 2018-07-02

## 2018-06-29 NOTE — H&P ADULT - NSHPSOCIALHISTORY_GEN_ALL_CORE
former smoker; quit 35 yrs ago; smoked 1 pack/day X 10-15 yrs  denies any drug use  drinks ETOH socially

## 2018-06-29 NOTE — H&P ADULT - RS GEN PE MLT RESP DETAILS PC
breath sounds equal/no rales/no wheezes/no subcutaneous emphysema/airway patent/respirations non-labored/normal/clear to auscultation bilaterally/no rhonchi/good air movement/no chest wall tenderness/no intercostal retractions

## 2018-06-29 NOTE — H&P ADULT - HISTORY OF PRESENT ILLNESS
89 y/o F former smoker with FHX of CAD (brother CABG),  PMH of HTN, HLD, CAD s/p CABG X 4 at [facility] 10 years ago (Report pending); CVA (? Deficits)  who presented to their cardiologist Dr. Koroma c/o increasing and more frequent  SSCP and KAPADIA X past few weeks.  Pt. describes the CP as being 8/10 SS which is worse with exertion and relieved with rest. Pt. also c/o RLE claudication.   Pt denies … , dizziness, diaphoresis, palpitations, fatigue, LE edema, orthopnea, PND, syncope  Due to pts risk factors, worsening CCS Angina Class _ Sx,, pt is referred for cardiac catheterization w/ possible intervention. 89 y/o F former smoker with FHX of CAD (brother CABG),  PMH of HTN, HLD, CAD s/p CABG X 4 at [facility] 10 years ago (Report pending); CVA (? Deficits)  who presented to their cardiologist Dr. Koroma c/o increasing and more frequent  SSCP and KAPADIA X past few weeks.  Pt. describes the CP as being 8/10 SS which is worse with exertion and relieved with rest. Pt. also c/o RLE claudication.   Pt denies … , dizziness, diaphoresis, palpitations, fatigue, LE edema, orthopnea, PND, syncope  EKG in office revealed ST depression in 1 and AVL.   Due to pts risk factors, worsening CCS Angina Class _ Sx,, pt is referred for cardiac catheterization w/ possible intervention. 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 Koroma c/o increasing and more frequent  SSCP and KAPADIA X past few weeks. Pt. reports that she was here in [facility] ED 6/18/18 for CP, she  r/o ACS, trops were neagtive, EKG no changes and was instructed to f/u Dr. Koroma the next day. Pt. reports getting intermittent SSCP 8/10; non-radiating occuring occasionally on and off for the past few months associated with KAPADIA occuring independent of activity which is worse with exertion and relieved with rest. Pt denies dizziness, diaphoresis, palpitations, fatigue, LE edema, orthopnea, PND, syncope. EKG in office revealed ST depression in 1 and AVL ( as per MD note).  Due to pts risk factors, worsening CCS Angina Class 4 Sx,, pt is referred for cardiac catheterization w/ possible intervention.

## 2018-06-29 NOTE — H&P ADULT - ASSESSMENT
88 y/o F former smoker with FHX of CAD (brother CABG; mother MI),  PMH of HTN, HLD, CAD s/p CABG X 4 at [facility] 10 years ago (Report pending; Dr. 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 Pt denies bleeding, GI bleeding, hematemesis, hematuria, BRBPR or melena . Pt. reports being compliant with DAPT. Pt. took plavix 75 mg PO X 1 at home today prior to coming in. Pt. given  mg PO X 1 today.   Cr. 0.80 IV NS@ 75cc/hr pre-cath.  Risks & benefits of procedure and alternative therapy have been explained to the patient including but not limited to: allergic reaction, bleeding w/possible need for blood transfusion, infection, renal and vascular compromise, limb damage, arrhythmia, stroke, vessel dissection/perforation, Myocardial infarction, emergent CABG. Informed consent obtained and in chart

## 2018-06-29 NOTE — H&P ADULT - NSHPLABSRESULTS_GEN_ALL_CORE
13.8   5.8   )-----------( 293      ( 02 Jul 2018 08:12 )             42.8   07-02    140  |  100  |  16  ----------------------------<  89  4.0   |  28  |  0.80    Ca    9.8      02 Jul 2018 07:39    TPro  7.2  /  Alb  4.2  /  TBili  0.4  /  DBili  x   /  AST  21  /  ALT  19  /  AlkPhos  101  07-02  PT/INR - ( 02 Jul 2018 07:39 )   PT: 10.4 sec;   INR: 0.94          PTT - ( 02 Jul 2018 07:39 )  PTT:30.6 sec

## 2018-06-29 NOTE — H&P ADULT - FAMILY HISTORY
Sibling  Still living? Unknown  Family history of CABG, Age at diagnosis: Age Unknown     Mother  Still living? Unknown  Family history of MI (myocardial infarction), Age at diagnosis: Age Unknown

## 2018-07-02 ENCOUNTER — OUTPATIENT (OUTPATIENT)
Dept: OUTPATIENT SERVICES | Facility: HOSPITAL | Age: 83
LOS: 1 days | Discharge: NURSING, REHAB OR LONG-TERM CARE | End: 2018-07-02
Payer: MEDICARE

## 2018-07-02 DIAGNOSIS — Z95.1 PRESENCE OF AORTOCORONARY BYPASS GRAFT: Chronic | ICD-10-CM

## 2018-07-02 LAB
ALBUMIN SERPL ELPH-MCNC: 4.2 G/DL — SIGNIFICANT CHANGE UP (ref 3.3–5)
ALP SERPL-CCNC: 101 U/L — SIGNIFICANT CHANGE UP (ref 40–120)
ALT FLD-CCNC: 19 U/L — SIGNIFICANT CHANGE UP (ref 10–45)
ANION GAP SERPL CALC-SCNC: 12 MMOL/L — SIGNIFICANT CHANGE UP (ref 5–17)
APTT BLD: 30.6 SEC — SIGNIFICANT CHANGE UP (ref 27.5–37.4)
AST SERPL-CCNC: 21 U/L — SIGNIFICANT CHANGE UP (ref 10–40)
BASOPHILS NFR BLD AUTO: 0.7 % — SIGNIFICANT CHANGE UP (ref 0–2)
BILIRUB SERPL-MCNC: 0.4 MG/DL — SIGNIFICANT CHANGE UP (ref 0.2–1.2)
BUN SERPL-MCNC: 16 MG/DL — SIGNIFICANT CHANGE UP (ref 7–23)
CALCIUM SERPL-MCNC: 9.8 MG/DL — SIGNIFICANT CHANGE UP (ref 8.4–10.5)
CHLORIDE SERPL-SCNC: 100 MMOL/L — SIGNIFICANT CHANGE UP (ref 96–108)
CHOLEST SERPL-MCNC: 200 MG/DL — HIGH (ref 10–199)
CK MB CFR SERPL CALC: 1.9 NG/ML — SIGNIFICANT CHANGE UP (ref 0–6.7)
CK SERPL-CCNC: 79 U/L — SIGNIFICANT CHANGE UP (ref 25–170)
CO2 SERPL-SCNC: 28 MMOL/L — SIGNIFICANT CHANGE UP (ref 22–31)
CREAT SERPL-MCNC: 0.8 MG/DL — SIGNIFICANT CHANGE UP (ref 0.5–1.3)
CRP SERPL-MCNC: 0.08 MG/DL — SIGNIFICANT CHANGE UP (ref 0–0.4)
EOSINOPHIL NFR BLD AUTO: 3.3 % — SIGNIFICANT CHANGE UP (ref 0–6)
GLUCOSE SERPL-MCNC: 89 MG/DL — SIGNIFICANT CHANGE UP (ref 70–99)
HBA1C BLD-MCNC: 5.9 % — HIGH (ref 4–5.6)
HCT VFR BLD CALC: 42.8 % — SIGNIFICANT CHANGE UP (ref 34.5–45)
HDLC SERPL-MCNC: 82 MG/DL — SIGNIFICANT CHANGE UP (ref 40–125)
HGB BLD-MCNC: 13.8 G/DL — SIGNIFICANT CHANGE UP (ref 11.5–15.5)
INR BLD: 0.94 — SIGNIFICANT CHANGE UP (ref 0.88–1.16)
LIPID PNL WITH DIRECT LDL SERPL: 97 MG/DL — SIGNIFICANT CHANGE UP
LYMPHOCYTES # BLD AUTO: 34.5 % — SIGNIFICANT CHANGE UP (ref 13–44)
MCHC RBC-ENTMCNC: 31.2 PG — SIGNIFICANT CHANGE UP (ref 27–34)
MCHC RBC-ENTMCNC: 32.2 G/DL — SIGNIFICANT CHANGE UP (ref 32–36)
MCV RBC AUTO: 96.8 FL — SIGNIFICANT CHANGE UP (ref 80–100)
MONOCYTES NFR BLD AUTO: 9.8 % — SIGNIFICANT CHANGE UP (ref 2–14)
NEUTROPHILS NFR BLD AUTO: 51.7 % — SIGNIFICANT CHANGE UP (ref 43–77)
PLATELET # BLD AUTO: 293 K/UL — SIGNIFICANT CHANGE UP (ref 150–400)
POTASSIUM SERPL-MCNC: 4 MMOL/L — SIGNIFICANT CHANGE UP (ref 3.5–5.3)
POTASSIUM SERPL-SCNC: 4 MMOL/L — SIGNIFICANT CHANGE UP (ref 3.5–5.3)
PROT SERPL-MCNC: 7.2 G/DL — SIGNIFICANT CHANGE UP (ref 6–8.3)
PROTHROM AB SERPL-ACNC: 10.4 SEC — SIGNIFICANT CHANGE UP (ref 9.8–12.7)
RBC # BLD: 4.42 M/UL — SIGNIFICANT CHANGE UP (ref 3.8–5.2)
RBC # FLD: 14.5 % — SIGNIFICANT CHANGE UP (ref 10.3–16.9)
SODIUM SERPL-SCNC: 140 MMOL/L — SIGNIFICANT CHANGE UP (ref 135–145)
TOTAL CHOLESTEROL/HDL RATIO MEASUREMENT: 2.4 RATIO — LOW (ref 3.3–7.1)
TRIGL SERPL-MCNC: 107 MG/DL — SIGNIFICANT CHANGE UP (ref 10–149)
WBC # BLD: 5.8 K/UL — SIGNIFICANT CHANGE UP (ref 3.8–10.5)
WBC # FLD AUTO: 5.8 K/UL — SIGNIFICANT CHANGE UP (ref 3.8–10.5)

## 2018-07-02 PROCEDURE — 85730 THROMBOPLASTIN TIME PARTIAL: CPT

## 2018-07-02 PROCEDURE — 93005 ELECTROCARDIOGRAM TRACING: CPT

## 2018-07-02 PROCEDURE — 85025 COMPLETE CBC W/AUTO DIFF WBC: CPT

## 2018-07-02 PROCEDURE — C1769: CPT

## 2018-07-02 PROCEDURE — 85610 PROTHROMBIN TIME: CPT

## 2018-07-02 PROCEDURE — C1894: CPT

## 2018-07-02 PROCEDURE — 80061 LIPID PANEL: CPT

## 2018-07-02 PROCEDURE — 82550 ASSAY OF CK (CPK): CPT

## 2018-07-02 PROCEDURE — 99234 HOSP IP/OBS SM DT SF/LOW 45: CPT

## 2018-07-02 PROCEDURE — C1889: CPT

## 2018-07-02 PROCEDURE — 80053 COMPREHEN METABOLIC PANEL: CPT

## 2018-07-02 PROCEDURE — 83036 HEMOGLOBIN GLYCOSYLATED A1C: CPT

## 2018-07-02 PROCEDURE — 93010 ELECTROCARDIOGRAM REPORT: CPT

## 2018-07-02 PROCEDURE — 82553 CREATINE MB FRACTION: CPT

## 2018-07-02 PROCEDURE — C1760: CPT

## 2018-07-02 PROCEDURE — C1887: CPT

## 2018-07-02 PROCEDURE — 93459 L HRT ART/GRFT ANGIO: CPT

## 2018-07-02 PROCEDURE — 93458 L HRT ARTERY/VENTRICLE ANGIO: CPT | Mod: 26

## 2018-07-02 PROCEDURE — 86140 C-REACTIVE PROTEIN: CPT

## 2018-07-02 RX ORDER — SODIUM CHLORIDE 9 MG/ML
500 INJECTION INTRAMUSCULAR; INTRAVENOUS; SUBCUTANEOUS
Qty: 0 | Refills: 0 | Status: DISCONTINUED | OUTPATIENT
Start: 2018-07-02 | End: 2018-07-02

## 2018-07-02 RX ORDER — ASPIRIN/CALCIUM CARB/MAGNESIUM 324 MG
325 TABLET ORAL ONCE
Qty: 0 | Refills: 0 | Status: COMPLETED | OUTPATIENT
Start: 2018-07-02 | End: 2018-07-02

## 2018-07-02 RX ADMIN — Medication 325 MILLIGRAM(S): at 08:49

## 2018-07-02 RX ADMIN — SODIUM CHLORIDE 75 MILLILITER(S): 9 INJECTION INTRAMUSCULAR; INTRAVENOUS; SUBCUTANEOUS at 08:41

## 2018-07-02 NOTE — PROGRESS NOTE ADULT - SUBJECTIVE AND OBJECTIVE BOX
Interventional Cardiology PA SDA Discharge Note    Patient without complaints. Ambulated and voided without difficulties    Afebrile, VSS    Ext:    		Right  Groin:   No    hematoma, no  bruit, dressing; C/D/I  		    Pulses:    intact DP/PT to baseline     A/P:    89 y/o F former smoker with FHX of CAD (brother CABG; mother MI),  PMH of HTN, HLD, CAD s/p CABG X 4 at 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 Koroma c/o increasing and more frequent  SSCP and KAPADIA X past few weeks. Pt. reports that she was here in [facility] ED 6/18/18 for CP, she  r/o ACS, trops were neagtive, EKG no changes and was instructed to f/u Dr. Koroma the next day. Pt. reports getting intermittent SSCP 8/10; non-radiating occuring occasionally on and off for the past few months associated with KAPADIA occuring independent of activity which is worse with exertion and relieved with rest. Pt denies dizziness, diaphoresis, palpitations, fatigue, LE edema, orthopnea, PND, syncope. EKG in office revealed ST depression in 1 and AVL ( as per MD note).  Due to pts risk factors, worsening CCS Angina Class 4 Sx,, pt was referred for cardiac catheterization w/ possible intervention. She is now s/p cardiac catheterization 7/2/18 revealing ?      1.	Stable for discharge as per attending Dr. Beltran after bed rest, pt voids, groin stable and 30 minutes of ambulation.  2.	Follow-up with PMD/Cardiologist Dr. Koroma in 1-2 weeks  3.	Discharged forms signed and copies in chart Interventional Cardiology PA SDA Discharge Note    Patient without complaints. 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 Koroma c/o increasing and more frequent  SSCP and KAPADIA X past few weeks. Pt. reports that she was here in [facility] ED 6/18/18 for CP, she  r/o ACS, trops were neagtive, EKG no changes and was instructed to f/u Dr. Koroma the next day. Pt. reports getting intermittent SSCP 8/10; non-radiating occuring occasionally on and off for the past few months associated with KAPADIA occuring independent of activity which is worse with exertion and relieved with rest. Pt denies dizziness, diaphoresis, palpitations, fatigue, LE edema, orthopnea, PND, syncope. EKG in office revealed ST depression in 1 and AVL ( as per MD note).  Due to pts risk factors, worsening CCS Angina Class 4 Sx,, pt was referred for cardiac catheterization w/ possible intervention. She is now s/p cardiac catheterization 7/2/18 revealing 3VCAD with patents grafts SVG to RPDA, SVG to OM1, LIMA to LAD, normal LVEDP 5 mmHg, no LV gram secondary to reduced GFR, right groin Perclose. Recommended to continue medical management.       1.	Stable for discharge as per attending Dr. Beltran after bed rest, pt voids, groin stable and 30 minutes of ambulation.  2.	Follow-up with PMD/Cardiologist Dr. Koroma in 1-2 weeks  3.	Discharged forms signed and copies in chart

## 2018-07-17 ENCOUNTER — APPOINTMENT (OUTPATIENT)
Dept: HEART AND VASCULAR | Facility: CLINIC | Age: 83
End: 2018-07-17
Payer: MEDICARE

## 2018-07-17 VITALS
SYSTOLIC BLOOD PRESSURE: 121 MMHG | TEMPERATURE: 97.1 F | HEIGHT: 57.48 IN | WEIGHT: 128 LBS | DIASTOLIC BLOOD PRESSURE: 77 MMHG | HEART RATE: 64 BPM | BODY MASS INDEX: 27.24 KG/M2 | OXYGEN SATURATION: 98 %

## 2018-07-17 PROCEDURE — 99214 OFFICE O/P EST MOD 30 MIN: CPT

## 2018-07-17 PROCEDURE — 93000 ELECTROCARDIOGRAM COMPLETE: CPT

## 2019-01-09 ENCOUNTER — APPOINTMENT (OUTPATIENT)
Dept: OPHTHALMOLOGY | Facility: CLINIC | Age: 84
End: 2019-01-09

## 2019-05-11 ENCOUNTER — EMERGENCY (EMERGENCY)
Facility: HOSPITAL | Age: 84
LOS: 1 days | Discharge: HOME | End: 2019-05-11
Attending: EMERGENCY MEDICINE | Admitting: EMERGENCY MEDICINE
Payer: COMMERCIAL

## 2019-05-11 VITALS
SYSTOLIC BLOOD PRESSURE: 167 MMHG | DIASTOLIC BLOOD PRESSURE: 80 MMHG | RESPIRATION RATE: 16 BRPM | TEMPERATURE: 98 F | OXYGEN SATURATION: 98 % | HEART RATE: 65 BPM

## 2019-05-11 VITALS
RESPIRATION RATE: 16 BRPM | OXYGEN SATURATION: 98 % | HEART RATE: 66 BPM | DIASTOLIC BLOOD PRESSURE: 68 MMHG | TEMPERATURE: 99 F | WEIGHT: 132.06 LBS | HEIGHT: 61 IN | SYSTOLIC BLOOD PRESSURE: 124 MMHG

## 2019-05-11 DIAGNOSIS — Z95.1 PRESENCE OF AORTOCORONARY BYPASS GRAFT: Chronic | ICD-10-CM
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
## 2019-05-11 LAB
ANION GAP SERPL CALC-SCNC: 10 MMOL/L — SIGNIFICANT CHANGE UP (ref 5–17)
BASOPHILS # BLD AUTO: 0.02 K/UL — SIGNIFICANT CHANGE UP (ref 0–0.2)
BASOPHILS NFR BLD AUTO: 0.1 % — SIGNIFICANT CHANGE UP (ref 0–2)
BUN SERPL-MCNC: 19 MG/DL — SIGNIFICANT CHANGE UP (ref 7–23)
CALCIUM SERPL-MCNC: 9.2 MG/DL — SIGNIFICANT CHANGE UP (ref 8.4–10.5)
CHLORIDE SERPL-SCNC: 105 MMOL/L — SIGNIFICANT CHANGE UP (ref 96–108)
CO2 SERPL-SCNC: 25 MMOL/L — SIGNIFICANT CHANGE UP (ref 22–31)
CREAT SERPL-MCNC: 0.67 MG/DL — SIGNIFICANT CHANGE UP (ref 0.5–1.3)
EOSINOPHIL # BLD AUTO: 0.01 K/UL — SIGNIFICANT CHANGE UP (ref 0–0.5)
EOSINOPHIL NFR BLD AUTO: 0.1 % — SIGNIFICANT CHANGE UP (ref 0–6)
GLUCOSE SERPL-MCNC: 102 MG/DL — HIGH (ref 70–99)
HCT VFR BLD CALC: 38.1 % — SIGNIFICANT CHANGE UP (ref 34.5–45)
HGB BLD-MCNC: 12.6 G/DL — SIGNIFICANT CHANGE UP (ref 11.5–15.5)
IMM GRANULOCYTES NFR BLD AUTO: 0.8 % — SIGNIFICANT CHANGE UP (ref 0–1.5)
LYMPHOCYTES # BLD AUTO: 1.74 K/UL — SIGNIFICANT CHANGE UP (ref 1–3.3)
LYMPHOCYTES # BLD AUTO: 10.9 % — LOW (ref 13–44)
MCHC RBC-ENTMCNC: 31.3 PG — SIGNIFICANT CHANGE UP (ref 27–34)
MCHC RBC-ENTMCNC: 33.1 GM/DL — SIGNIFICANT CHANGE UP (ref 32–36)
MCV RBC AUTO: 94.5 FL — SIGNIFICANT CHANGE UP (ref 80–100)
MONOCYTES # BLD AUTO: 1.45 K/UL — HIGH (ref 0–0.9)
MONOCYTES NFR BLD AUTO: 9.1 % — SIGNIFICANT CHANGE UP (ref 2–14)
NEUTROPHILS # BLD AUTO: 12.63 K/UL — HIGH (ref 1.8–7.4)
NEUTROPHILS NFR BLD AUTO: 79 % — HIGH (ref 43–77)
NRBC # BLD: 0 /100 WBCS — SIGNIFICANT CHANGE UP (ref 0–0)
PLATELET # BLD AUTO: 320 K/UL — SIGNIFICANT CHANGE UP (ref 150–400)
POTASSIUM SERPL-MCNC: 4 MMOL/L — SIGNIFICANT CHANGE UP (ref 3.5–5.3)
POTASSIUM SERPL-SCNC: 4 MMOL/L — SIGNIFICANT CHANGE UP (ref 3.5–5.3)
RBC # BLD: 4.03 M/UL — SIGNIFICANT CHANGE UP (ref 3.8–5.2)
RBC # FLD: 13.6 % — SIGNIFICANT CHANGE UP (ref 10.3–14.5)
SODIUM SERPL-SCNC: 140 MMOL/L — SIGNIFICANT CHANGE UP (ref 135–145)
WBC # BLD: 15.97 K/UL — HIGH (ref 3.8–10.5)
WBC # FLD AUTO: 15.97 K/UL — HIGH (ref 3.8–10.5)

## 2019-05-11 PROCEDURE — 90471 IMMUNIZATION ADMIN: CPT

## 2019-05-11 PROCEDURE — 74177 CT ABD & PELVIS W/CONTRAST: CPT

## 2019-05-11 PROCEDURE — 99284 EMERGENCY DEPT VISIT MOD MDM: CPT | Mod: 25

## 2019-05-11 PROCEDURE — 71260 CT THORAX DX C+: CPT

## 2019-05-11 PROCEDURE — 72125 CT NECK SPINE W/O DYE: CPT | Mod: 26

## 2019-05-11 PROCEDURE — 90715 TDAP VACCINE 7 YRS/> IM: CPT

## 2019-05-11 PROCEDURE — 70450 CT HEAD/BRAIN W/O DYE: CPT

## 2019-05-11 PROCEDURE — 74177 CT ABD & PELVIS W/CONTRAST: CPT | Mod: 26

## 2019-05-11 PROCEDURE — 71260 CT THORAX DX C+: CPT | Mod: 26

## 2019-05-11 PROCEDURE — 85025 COMPLETE CBC W/AUTO DIFF WBC: CPT

## 2019-05-11 PROCEDURE — 80048 BASIC METABOLIC PNL TOTAL CA: CPT

## 2019-05-11 PROCEDURE — 72125 CT NECK SPINE W/O DYE: CPT

## 2019-05-11 PROCEDURE — 70450 CT HEAD/BRAIN W/O DYE: CPT | Mod: 26

## 2019-05-11 PROCEDURE — 99284 EMERGENCY DEPT VISIT MOD MDM: CPT

## 2019-05-11 RX ORDER — TETANUS TOXOID, REDUCED DIPHTHERIA TOXOID AND ACELLULAR PERTUSSIS VACCINE, ADSORBED 5; 2.5; 8; 8; 2.5 [IU]/.5ML; [IU]/.5ML; UG/.5ML; UG/.5ML; UG/.5ML
0.5 SUSPENSION INTRAMUSCULAR ONCE
Refills: 0 | Status: COMPLETED | OUTPATIENT
Start: 2019-05-11 | End: 2019-05-11

## 2019-05-11 RX ADMIN — TETANUS TOXOID, REDUCED DIPHTHERIA TOXOID AND ACELLULAR PERTUSSIS VACCINE, ADSORBED 0.5 MILLILITER(S): 5; 2.5; 8; 8; 2.5 SUSPENSION INTRAMUSCULAR at 16:24

## 2019-05-11 NOTE — ED PROVIDER NOTE - SKIN, MLM
+ Superficial abrasions to bilateral knees.  Skin is otherwise normal color for race, warm, dry and intact. No evidence of rash.

## 2019-05-11 NOTE — ED PROVIDER NOTE - MUSCULOSKELETAL, MLM
Pelvis is stable with compression - no pain on palpation of hips, bilaterally.  No paradoxical chest wall motion, no overlying ecchymosis or skin breakdown.  + TTP over bilateral paracervical musculature.  Spine appears normal there is no midline cervical, thoracic or lumbar spine pain/tenderness, range of motion is not limited, no muscle or joint tenderness

## 2019-05-11 NOTE — ED PROVIDER NOTE - OBJECTIVE STATEMENT
89 year old female with history of CAD and previous stroke without residual deficits presents to ED via EMS transport status post fall while trying to get onto bus today.  Patient states she was stepping onto bus when the bus began to move and she lost her balance falling inbetween the bus doors.  She now complains of pain to paracervical musculature of neck and abrasions to knees.  She is moving all extremities without difficulty and does not recall hit to head or any loss of consciousness.  She notes bystanders came over to her immediately.  She has not tried to ambulate since incident.  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  Last tetanus is unknown. 89 year old female with history of CAD and previous stroke without residual deficits presents to ED via EMS transport status post fall while trying to get onto bus today.  Patient states she was stepping onto bus when the bus began to move and she lost her balance falling inbetween the bus doors.  She now complains of pain to paracervical musculature of neck and abrasions to knees.  She is moving all extremities without difficulty and does not recall hit to head or any loss of consciousness.  She notes bystanders came over to her immediately.  She has not tried to ambulate since incident.  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  Last tetanus is unknown.

## 2019-05-11 NOTE — ED ADULT TRIAGE NOTE - CADM TRG TX PRIOR TO ARRIVAL
Called pt today regarding glucose levels.  Current diabetes medications:  Basaglar 25 units daily, Jardiance 10 mg am.  Pt is not home but reports fasting glucose levels have been less than 130 but levels 2 hours post supper have been in the 200's.  Yesterday pt increased his dose of Jardiance to 20 mg and he reports today's fasting level was 95 and level at 3 pm was in the 90's also.  Pt will continue on 20 mg dose and if it is effective we can order 25 mg pills via pharmacy.  He has about 40 - 10 mg pills left.  Will speak with him again in one week.  A1c is due later this month.   
none

## 2019-05-11 NOTE — ED ADULT NURSE NOTE - OBJECTIVE STATEMENT
Pt. is A&Ox3, s/p mechanical fall, states she was trying to get on the bus with her cane when the door closed and she fell forwards. Pt. is on plavix and aspirin, does not recall if she hit her head, denies LOC. Pt. c/o posterior neck and R hip pain after fall, states she has chronic L hip pain. Pt. w/ full ROM and muscle strength in all extremities. Pt. w/ purple ecchymosis of R thumb and erythema of b/l knees, skin intact.

## 2019-05-11 NOTE — ED ADULT NURSE NOTE - INTERVENTIONS DEFINITIONS
Stretcher in lowest position, wheels locked, appropriate side rails in place/Monitor for mental status changes and reorient to person, place, and time/Instruct patient to call for assistance/Physically safe environment: no spills, clutter or unnecessary equipment

## 2019-05-11 NOTE — ED PROVIDER NOTE - CLINICAL SUMMARY MEDICAL DECISION MAKING FREE TEXT BOX
Patient in ED following mechanical fall with concern for neck strain and abrasions to bilateral knees.  Pain free movement x 4 extremities, pelvis stable, no midline  spine pain.  Ambulating in ED without difficulty.  CT imaging of head, cervical spine, chest, abd/pel completed as per trauma protocol given mechanism of injury.  Imaging without evidence of acute process.  Cervical collar is cleared, tetanus is updated.  Will plan for discharge home with instruction for tylenol for discomfort as needed and prompt PCP follow up in 1-2 days for re evaluation.  Patient is instructed to return to ED immediately should her symptoms worsen or if she has any concern prior to this recommended follow up.  Patient is aware of plan and verbalizes his understanding.  Will discharge home at this time. Patient in ED following mechanical fall with concern for neck strain and abrasions to bilateral knees.  Pain free movement x 4 extremities, pelvis stable, no midline  spine pain.  Ambulating in ED without difficulty.  CT imaging of head, cervical spine, chest, abd/pel completed as per trauma protocol given mechanism of injury.  Imaging without evidence of acute process.  Incidental findings discussed and patient will follow with her PCP For monitoring/further eval.  Cervical collar is cleared, tetanus is updated.  Will plan for discharge home with instruction for tylenol for discomfort as needed and prompt PCP follow up in 1-2 days for re evaluation.  Patient is instructed to return to ED immediately should her symptoms worsen or if she has any concern prior to this recommended follow up.  Patient is aware of plan and verbalizes his understanding.  Will discharge home at this time.

## 2019-05-11 NOTE — ED PROVIDER NOTE - NSFOLLOWUPINSTRUCTIONS_ED_ALL_ED_FT
Please follow up with your primary physician in 1-2 days for re evaluation.  Please return to ER immediately should your symptoms worsen or if you have any concern prior to this recommended follow up.     :  [facility]             CONTUSION IN ADULTS - AfterCare(R) Instructions(ER/ED)     Contusion in Adults    WHAT YOU NEED TO KNOW:    A contusion is a bruise that appears on your skin after an injury. A bruise happens when small blood vessels tear but skin does not. When blood vessels tear, blood leaks into nearby tissue, such as soft tissue or muscle.    DISCHARGE INSTRUCTIONS:    Return to the emergency department if:     You have new trouble moving the injured area.      You have tingling or numbness in or near the injured area.      Your hand or foot below the bruise gets cold or turns pale.     Contact your healthcare provider if:     You find a new lump in the injured area.      Your symptoms do not improve with treatment after 4 to 5 days.      You have questions or concerns about your condition or care.    Medicines: You may need any of the following:     NSAIDs help decrease swelling and pain or fever. This medicine is available with or without a doctor's order. NSAIDs can cause stomach bleeding or kidney problems in certain people. If you take blood thinner medicine, always ask your healthcare provider if NSAIDs are safe for you. Always read the medicine label and follow directions.      Prescription pain medicine may be given. Do not wait until the pain is severe before you take your medicine.      Take your medicine as directed. Contact your healthcare provider if you think your medicine is not helping or if you have side effects. Tell him of her if you are allergic to any medicine. Keep a list of the medicines, vitamins, and herbs you take. Include the amounts, and when and why you take them. Bring the list or the pill bottles to follow-up visits. Carry your medicine list with you in case of an emergency.    Follow up with your healthcare provider as directed: You may need to return within a week to check your injury again. Write down your questions so you remember to ask them during your visits.    Help a contusion heal:     Rest the injured area or use it less than usual. If you bruised your leg or foot, you may need crutches or a cane to help you walk. This will help you keep weight off your injured body part.       Apply ice to decrease swelling and pain. Ice may also help prevent tissue damage. Use an ice pack, or put crushed ice in a plastic bag. Cover it with a towel and place it on your bruise for 15 to 20 minutes every hour or as directed.      Use compression to support the area and decrease swelling. Wrap an elastic bandage around the area over the bruised muscle. Make sure the bandage is not too tight. You should be able to fit 1 finger between the bandage and your skin.      Elevate (raise) your injured body part above the level of your heart to help decrease pain and swelling. Use pillows, blankets, or rolled towels to elevate the area as often as you can.      Do not drink alcohol as directed. Alcohol may slow healing.      Do not stretch injured muscles right after your injury. Ask your healthcare provider when and how you may safely stretch after your injury. Gentle stretches can help increase your flexibility.      Do not massage the area or put heating pads on the bruise right after your injury. Heat and massage may slow healing. Your healthcare provider may tell you to apply heat after several days. At that time, heat will start to help the injury heal.    Prevent another contusion:     Stretch and warm up before you play sports or exercise.      Wear protective gear when you play sports. Examples are shin guards and padding.       If you begin a new physical activity, start slowly to give your body a chance to adjust.         © Copyright [employer] 2019 All illustrations and images included in CareNotes are the copyrighted property of [employer] or [employer].      back to top                      © Copyright [employer] 2019

## 2019-05-11 NOTE — ED PROVIDER NOTE - CARE PLAN
Principal Discharge DX:	Neck strain, initial encounter  Secondary Diagnosis:	Fall, initial encounter  Secondary Diagnosis:	Abrasions of multiple sites

## 2019-05-12 PROBLEM — I63.9 CEREBRAL INFARCTION, UNSPECIFIED: Chronic | Status: ACTIVE | Noted: 2017-03-22

## 2019-05-12 PROBLEM — I25.10 ATHEROSCLEROTIC HEART DISEASE OF NATIVE CORONARY ARTERY WITHOUT ANGINA PECTORIS: Chronic | Status: ACTIVE | Noted: 2017-03-22

## 2019-05-15 DIAGNOSIS — Z79.82 LONG TERM (CURRENT) USE OF ASPIRIN: ICD-10-CM

## 2019-05-15 DIAGNOSIS — Z88.5 ALLERGY STATUS TO NARCOTIC AGENT: ICD-10-CM

## 2019-05-15 DIAGNOSIS — Z23 ENCOUNTER FOR IMMUNIZATION: ICD-10-CM

## 2019-05-15 DIAGNOSIS — Z79.899 OTHER LONG TERM (CURRENT) DRUG THERAPY: ICD-10-CM

## 2019-05-15 DIAGNOSIS — S16.1XXA STRAIN OF MUSCLE, FASCIA AND TENDON AT NECK LEVEL, INITIAL ENCOUNTER: ICD-10-CM
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
## 2019-05-15 DIAGNOSIS — S80.211A ABRASION, RIGHT KNEE, INITIAL ENCOUNTER: ICD-10-CM

## 2019-05-15 DIAGNOSIS — I25.10 ATHEROSCLEROTIC HEART DISEASE OF NATIVE CORONARY ARTERY WITHOUT ANGINA PECTORIS: ICD-10-CM

## 2019-05-15 DIAGNOSIS — M54.2 CERVICALGIA: ICD-10-CM

## 2019-05-15 DIAGNOSIS — S80.212A ABRASION, LEFT KNEE, INITIAL ENCOUNTER: ICD-10-CM

## 2019-09-04 ENCOUNTER — INPATIENT (INPATIENT)
Facility: HOSPITAL | Age: 84
LOS: 3 days | Discharge: HOME HEALTH | DRG: 68 | End: 2019-09-08
Attending: PSYCHIATRY & NEUROLOGY | Admitting: PSYCHIATRY & NEUROLOGY
Payer: MEDICARE

## 2019-09-04 VITALS
RESPIRATION RATE: 16 BRPM | OXYGEN SATURATION: 99 % | HEART RATE: 70 BPM | TEMPERATURE: 98 F | DIASTOLIC BLOOD PRESSURE: 57 MMHG | SYSTOLIC BLOOD PRESSURE: 114 MMHG

## 2019-09-04 DIAGNOSIS — Z29.9 ENCOUNTER FOR PROPHYLACTIC MEASURES, UNSPECIFIED: ICD-10-CM

## 2019-09-04 DIAGNOSIS — R63.8 OTHER SYMPTOMS AND SIGNS CONCERNING FOOD AND FLUID INTAKE: ICD-10-CM

## 2019-09-04 DIAGNOSIS — Z91.89 OTHER SPECIFIED PERSONAL RISK FACTORS, NOT ELSEWHERE CLASSIFIED: ICD-10-CM

## 2019-09-04 DIAGNOSIS — I25.10 ATHEROSCLEROTIC HEART DISEASE OF NATIVE CORONARY ARTERY WITHOUT ANGINA PECTORIS: ICD-10-CM

## 2019-09-04 DIAGNOSIS — Z95.1 PRESENCE OF AORTOCORONARY BYPASS GRAFT: Chronic | ICD-10-CM

## 2019-09-04 LAB
ALBUMIN SERPL ELPH-MCNC: 4 G/DL — SIGNIFICANT CHANGE UP (ref 3.3–5)
ALP SERPL-CCNC: 100 U/L — SIGNIFICANT CHANGE UP (ref 40–120)
ALT FLD-CCNC: 8 U/L — LOW (ref 10–45)
ANION GAP SERPL CALC-SCNC: 12 MMOL/L — SIGNIFICANT CHANGE UP (ref 5–17)
APPEARANCE UR: CLEAR — SIGNIFICANT CHANGE UP
APTT BLD: 33.9 SEC — SIGNIFICANT CHANGE UP (ref 27.5–36.3)
AST SERPL-CCNC: 12 U/L — SIGNIFICANT CHANGE UP (ref 10–40)
BACTERIA # UR AUTO: PRESENT /HPF
BASOPHILS # BLD AUTO: 0.08 K/UL — SIGNIFICANT CHANGE UP (ref 0–0.2)
BASOPHILS NFR BLD AUTO: 1.3 % — SIGNIFICANT CHANGE UP (ref 0–2)
BILIRUB SERPL-MCNC: 0.3 MG/DL — SIGNIFICANT CHANGE UP (ref 0.2–1.2)
BILIRUB UR-MCNC: NEGATIVE — SIGNIFICANT CHANGE UP
BUN SERPL-MCNC: 20 MG/DL — SIGNIFICANT CHANGE UP (ref 7–23)
CALCIUM SERPL-MCNC: 9.2 MG/DL — SIGNIFICANT CHANGE UP (ref 8.4–10.5)
CHLORIDE SERPL-SCNC: 107 MMOL/L — SIGNIFICANT CHANGE UP (ref 96–108)
CHOLEST SERPL-MCNC: 163 MG/DL — SIGNIFICANT CHANGE UP (ref 10–199)
CO2 SERPL-SCNC: 25 MMOL/L — SIGNIFICANT CHANGE UP (ref 22–31)
COLOR SPEC: YELLOW — SIGNIFICANT CHANGE UP
COMMENT - URINE: SIGNIFICANT CHANGE UP
CREAT SERPL-MCNC: 0.83 MG/DL — SIGNIFICANT CHANGE UP (ref 0.5–1.3)
DIFF PNL FLD: NEGATIVE — SIGNIFICANT CHANGE UP
EOSINOPHIL # BLD AUTO: 0.38 K/UL — SIGNIFICANT CHANGE UP (ref 0–0.5)
EOSINOPHIL NFR BLD AUTO: 6.1 % — HIGH (ref 0–6)
EPI CELLS # UR: SIGNIFICANT CHANGE UP /HPF (ref 0–5)
GLUCOSE SERPL-MCNC: 88 MG/DL — SIGNIFICANT CHANGE UP (ref 70–99)
GLUCOSE UR QL: NEGATIVE — SIGNIFICANT CHANGE UP
HCT VFR BLD CALC: 40.5 % — SIGNIFICANT CHANGE UP (ref 34.5–45)
HDLC SERPL-MCNC: 60 MG/DL — SIGNIFICANT CHANGE UP
HGB BLD-MCNC: 12.8 G/DL — SIGNIFICANT CHANGE UP (ref 11.5–15.5)
IMM GRANULOCYTES NFR BLD AUTO: 0.3 % — SIGNIFICANT CHANGE UP (ref 0–1.5)
INR BLD: 0.95 — SIGNIFICANT CHANGE UP (ref 0.88–1.16)
KETONES UR-MCNC: NEGATIVE — SIGNIFICANT CHANGE UP
LEUKOCYTE ESTERASE UR-ACNC: ABNORMAL
LIPID PNL WITH DIRECT LDL SERPL: 68 MG/DL — SIGNIFICANT CHANGE UP
LYMPHOCYTES # BLD AUTO: 1.88 K/UL — SIGNIFICANT CHANGE UP (ref 1–3.3)
LYMPHOCYTES # BLD AUTO: 30.3 % — SIGNIFICANT CHANGE UP (ref 13–44)
MCHC RBC-ENTMCNC: 30.3 PG — SIGNIFICANT CHANGE UP (ref 27–34)
MCHC RBC-ENTMCNC: 31.6 GM/DL — LOW (ref 32–36)
MCV RBC AUTO: 95.7 FL — SIGNIFICANT CHANGE UP (ref 80–100)
MONOCYTES # BLD AUTO: 0.71 K/UL — SIGNIFICANT CHANGE UP (ref 0–0.9)
MONOCYTES NFR BLD AUTO: 11.4 % — SIGNIFICANT CHANGE UP (ref 2–14)
NEUTROPHILS # BLD AUTO: 3.14 K/UL — SIGNIFICANT CHANGE UP (ref 1.8–7.4)
NEUTROPHILS NFR BLD AUTO: 50.6 % — SIGNIFICANT CHANGE UP (ref 43–77)
NITRITE UR-MCNC: NEGATIVE — SIGNIFICANT CHANGE UP
NRBC # BLD: 0 /100 WBCS — SIGNIFICANT CHANGE UP (ref 0–0)
PA ADP PRP-ACNC: 145 PRU — LOW (ref 194–418)
PH UR: 6.5 — SIGNIFICANT CHANGE UP (ref 5–8)
PLATELET # BLD AUTO: 290 K/UL — SIGNIFICANT CHANGE UP (ref 150–400)
POTASSIUM SERPL-MCNC: 4.6 MMOL/L — SIGNIFICANT CHANGE UP (ref 3.5–5.3)
POTASSIUM SERPL-SCNC: 4.6 MMOL/L — SIGNIFICANT CHANGE UP (ref 3.5–5.3)
PROT SERPL-MCNC: 6.7 G/DL — SIGNIFICANT CHANGE UP (ref 6–8.3)
PROT UR-MCNC: NEGATIVE MG/DL — SIGNIFICANT CHANGE UP
PROTHROM AB SERPL-ACNC: 10.7 SEC — SIGNIFICANT CHANGE UP (ref 10–12.9)
RBC # BLD: 4.23 M/UL — SIGNIFICANT CHANGE UP (ref 3.8–5.2)
RBC # FLD: 13.3 % — SIGNIFICANT CHANGE UP (ref 10.3–14.5)
RBC CASTS # UR COMP ASSIST: < 5 /HPF — SIGNIFICANT CHANGE UP
SODIUM SERPL-SCNC: 144 MMOL/L — SIGNIFICANT CHANGE UP (ref 135–145)
SP GR SPEC: <=1.005 — SIGNIFICANT CHANGE UP (ref 1–1.03)
TOTAL CHOLESTEROL/HDL RATIO MEASUREMENT: 2.7 RATIO — LOW (ref 3.3–7.1)
TRIGL SERPL-MCNC: 174 MG/DL — HIGH (ref 10–149)
TROPONIN T SERPL-MCNC: <0.01 NG/ML — SIGNIFICANT CHANGE UP (ref 0–0.01)
UROBILINOGEN FLD QL: 0.2 E.U./DL — SIGNIFICANT CHANGE UP
WBC # BLD: 6.21 K/UL — SIGNIFICANT CHANGE UP (ref 3.8–10.5)
WBC # FLD AUTO: 6.21 K/UL — SIGNIFICANT CHANGE UP (ref 3.8–10.5)
WBC UR QL: < 5 /HPF — SIGNIFICANT CHANGE UP

## 2019-09-04 PROCEDURE — 93306 TTE W/DOPPLER COMPLETE: CPT | Mod: 26

## 2019-09-04 PROCEDURE — 71045 X-RAY EXAM CHEST 1 VIEW: CPT | Mod: 26

## 2019-09-04 PROCEDURE — 70450 CT HEAD/BRAIN W/O DYE: CPT | Mod: 26,59

## 2019-09-04 PROCEDURE — 0042T: CPT

## 2019-09-04 PROCEDURE — 93010 ELECTROCARDIOGRAM REPORT: CPT

## 2019-09-04 PROCEDURE — 99291 CRITICAL CARE FIRST HOUR: CPT

## 2019-09-04 PROCEDURE — 70498 CT ANGIOGRAPHY NECK: CPT | Mod: 26

## 2019-09-04 PROCEDURE — 99223 1ST HOSP IP/OBS HIGH 75: CPT

## 2019-09-04 PROCEDURE — 70496 CT ANGIOGRAPHY HEAD: CPT | Mod: 26

## 2019-09-04 RX ORDER — CLOPIDOGREL BISULFATE 75 MG/1
75 TABLET, FILM COATED ORAL DAILY
Refills: 0 | Status: DISCONTINUED | OUTPATIENT
Start: 2019-09-05 | End: 2019-09-06

## 2019-09-04 RX ORDER — PANTOPRAZOLE SODIUM 20 MG/1
40 TABLET, DELAYED RELEASE ORAL
Refills: 0 | Status: DISCONTINUED | OUTPATIENT
Start: 2019-09-04 | End: 2019-09-08

## 2019-09-04 RX ORDER — ATORVASTATIN CALCIUM 80 MG/1
1 TABLET, FILM COATED ORAL
Qty: 0 | Refills: 0 | DISCHARGE

## 2019-09-04 RX ORDER — INFLUENZA VIRUS VACCINE 15; 15; 15; 15 UG/.5ML; UG/.5ML; UG/.5ML; UG/.5ML
0.5 SUSPENSION INTRAMUSCULAR ONCE
Refills: 0 | Status: DISCONTINUED | OUTPATIENT
Start: 2019-09-04 | End: 2019-09-08

## 2019-09-04 RX ORDER — HEPARIN SODIUM 5000 [USP'U]/ML
5000 INJECTION INTRAVENOUS; SUBCUTANEOUS EVERY 8 HOURS
Refills: 0 | Status: DISCONTINUED | OUTPATIENT
Start: 2019-09-04 | End: 2019-09-06

## 2019-09-04 RX ORDER — ASPIRIN/CALCIUM CARB/MAGNESIUM 324 MG
81 TABLET ORAL EVERY 24 HOURS
Refills: 0 | Status: DISCONTINUED | OUTPATIENT
Start: 2019-09-05 | End: 2019-09-05

## 2019-09-04 RX ORDER — METOPROLOL TARTRATE 50 MG
1 TABLET ORAL
Qty: 0 | Refills: 0 | DISCHARGE

## 2019-09-04 RX ORDER — ALPRAZOLAM 0.25 MG
0 TABLET ORAL
Qty: 0 | Refills: 0 | DISCHARGE

## 2019-09-04 RX ORDER — ATORVASTATIN CALCIUM 80 MG/1
80 TABLET, FILM COATED ORAL AT BEDTIME
Refills: 0 | Status: DISCONTINUED | OUTPATIENT
Start: 2019-09-04 | End: 2019-09-08

## 2019-09-04 RX ORDER — LANOLIN ALCOHOL/MO/W.PET/CERES
5 CREAM (GRAM) TOPICAL ONCE
Refills: 0 | Status: COMPLETED | OUTPATIENT
Start: 2019-09-04 | End: 2019-09-04

## 2019-09-04 RX ORDER — PANTOPRAZOLE SODIUM 20 MG/1
1 TABLET, DELAYED RELEASE ORAL
Qty: 0 | Refills: 0 | DISCHARGE

## 2019-09-04 RX ORDER — ASPIRIN/CALCIUM CARB/MAGNESIUM 324 MG
81 TABLET ORAL ONCE
Refills: 0 | Status: COMPLETED | OUTPATIENT
Start: 2019-09-04 | End: 2019-09-04

## 2019-09-04 RX ADMIN — ATORVASTATIN CALCIUM 80 MILLIGRAM(S): 80 TABLET, FILM COATED ORAL at 23:34

## 2019-09-04 RX ADMIN — Medication 81 MILLIGRAM(S): at 19:35

## 2019-09-04 RX ADMIN — HEPARIN SODIUM 5000 UNIT(S): 5000 INJECTION INTRAVENOUS; SUBCUTANEOUS at 23:30

## 2019-09-04 NOTE — ED PROVIDER NOTE - OBJECTIVE STATEMENT
89 y/o F with PMHx of CAD, CVA and PSHx of CABG presents to the ED with complaints of difficulty speaking and L sided facial and arm numbness intermittently since yesterday. Patient reports episode occurred at 1PM and 8PM while visiting her  in a nursing home. Patient notes symptoms again recurred this morning. Denies current numbness, headache, chest pain, SOB or any other acute complaints.

## 2019-09-04 NOTE — H&P ADULT - ASSESSMENT
89 year old F with PMHx of CAD, CABG x3, CVA (chronic left occipital lobe), spinal stenosis, sciatica s/p 3 epidural injections for left leg pain who presents with multiple episodes of word finding difficulties since 1 day ago and left facial numbness admitted to stroke telemetry to r/o stroke

## 2019-09-04 NOTE — PATIENT PROFILE ADULT - VISION (WITH CORRECTIVE LENSES IF THE PATIENT USUALLY WEARS THEM):
Cataract surgery according to patient./Partially impaired: cannot see medication labels or newsprint, but can see obstacles in path, and the surrounding layout; can count fingers at arm's length

## 2019-09-04 NOTE — ED PROVIDER NOTE - CRITICAL CARE PROVIDED
documentation/additional history taking/consultation with other physicians/interpretation of diagnostic studies/direct patient care (not related to procedure) consultation with other physicians/direct patient care (not related to procedure)

## 2019-09-04 NOTE — H&P ADULT - PROBLEM SELECTOR PLAN 5
1) PCP Contacted on Admission: (Y/N) --> Dr. Marvin [phone]  2) Date of Contact with PCP: TBD  3) PCP Contacted at Discharge: TBD  4) Summary of Handoff Given to PCP: TBD   5) Post-Discharge Appointment Date: TBD

## 2019-09-04 NOTE — ED PROVIDER NOTE - CLINICAL SUMMARY MEDICAL DECISION MAKING FREE TEXT BOX
89 y/o F presents with new aphasia and intermittent numbness. TIA versus CVA. Patient is out of tPA window. Seen by stroke team in ER. Will evaluate for metabolic abnormality and infection. Most likely admit to neurology. Stroke attending, Dr. Wan, plans to come to ER to evaluate patient in person. Disposition pending neurology recommendations.

## 2019-09-04 NOTE — ED ADULT TRIAGE NOTE - CHIEF COMPLAINT QUOTE
Pt c/o difficulty speaking for 5 mins and L neck numbness/discomfort around 1pm yesterday, same episode happened again at 8pm last night. Pt today reports numbness to L arm/neck and blurry vision of L eye. Sent in to by her PCP. Pt on anticoagulants for cardiac bypass sx. Pt walking steady, stuttering her words, also appears anxious.

## 2019-09-04 NOTE — ED ADULT NURSE NOTE - NSIMPLEMENTINTERV_GEN_ALL_ED
Implemented All Fall with Harm Risk Interventions:  [place] to call system. Call bell, personal items and telephone within reach. Instruct patient to call for assistance. Room bathroom lighting operational. Non-slip footwear when patient is off stretcher. Physically safe environment: no spills, clutter or unnecessary equipment. Stretcher in lowest position, wheels locked, appropriate side rails in place. Provide visual cue, wrist band, yellow gown, etc. Monitor gait and stability. Monitor for mental status changes and reorient to person, place, and time. Review medications for side effects contributing to fall risk. Reinforce activity limits and safety measures with patient and family. Provide visual clues: red socks.

## 2019-09-04 NOTE — H&P ADULT - NSHPPHYSICALEXAM_GEN_ALL_CORE
Physical exam:  General: No acute distress, awake and alert  Cardiovascular: Regular rate and rhythm, no murmurs, rubs, or gallops. No bruits  Pulmonary: Anterior breath sounds clear bilaterally, no crackles or wheezing. No use of accessory muscles  Extremities: Radial and DP pulses +2, no edema    Neurologic:  -Mental status: Awake, alert, oriented to person, place, and time. Speech is fluent with intact naming, repetition, and comprehension, no dysarthria. Recent and remote memory intact. Follows commands. Poor concentration intact. Fund of knowledge appropriate.  -Cranial nerves:   II: Visual fields are full to confrontation.  III, IV, VI: Extraocular movements are intact without nystagmus. Pupils equally round and reactive to light  V:  Facial sensation V1-V3 equal and intact   VII: Face is symmetric with normal eye closure and smile  VIII: Hearing is bilaterally intact to finger rub  IX, X: Uvula is midline and soft palate rises symmetrically  XI: Head turning and shoulder shrug are intact.  XII: Tongue protrudes midline  Motor: Normal bulk and tone. No pronator drift. Strength bilateral upper extremity 5/5, bilateral lower extremities 5/5.  Rapid alternating movements intact and symmetric  Sensation: Intact to light touch bilaterally. No neglect or extinction on double simultaneous testing.  Coordination: dysmetria noted finger-to-nose bilaterally but worse on L side, and no dysmetria heel-to-shin bilaterally  Gait: unable to assess

## 2019-09-04 NOTE — H&P ADULT - NSHPLABSRESULTS_GEN_ALL_CORE
LABS:                         12.8   6.21  )-----------( 290      ( 04 Sep 2019 11:16 )             40.5     09-04    144  |  107  |  20  ----------------------------<  88  4.6   |  25  |  0.83    Ca    9.2      04 Sep 2019 11:16    TPro  6.7  /  Alb  4.0  /  TBili  0.3  /  DBili  x   /  AST  12  /  ALT  8<L>  /  AlkPhos  100  09-04    PT/INR - ( 04 Sep 2019 11:16 )   PT: 10.7 sec;   INR: 0.95          PTT - ( 04 Sep 2019 11:16 )  PTT:33.9 sec  Urinalysis Basic - ( 04 Sep 2019 12:06 )    Color: Yellow / Appearance: Clear / SG: <=1.005 / pH: x  Gluc: x / Ketone: NEGATIVE  / Bili: Negative / Urobili: 0.2 E.U./dL   Blood: x / Protein: NEGATIVE mg/dL / Nitrite: NEGATIVE   Leuk Esterase: Trace / RBC: < 5 /HPF / WBC < 5 /HPF   Sq Epi: x / Non Sq Epi: 0-5 /HPF / Bacteria: Present /HPF      CARDIAC MARKERS ( 04 Sep 2019 11:16 )  x     / <0.01 ng/mL / x     / x     / x                RADIOLOGY, EKG & ADDITIONAL TESTS: Reviewed.

## 2019-09-04 NOTE — CONSULT NOTE ADULT - SUBJECTIVE AND OBJECTIVE BOX
**STROKE CODE CONSULT NOTE**  *****************INCOMPLETE******************************  Last known well time/Time of onset of symptoms: 9am 9/4/2019    HPI: 89y Female with PMHx of CAD, CABG, CVA (chronic left occipital lobe) who presents with multiple episodes of word finding difficulties and left facial numbness.     T(C): 36.7 (09-04-19 @ 12:46), Max: 36.8 (09-04-19 @ 11:06)  HR: 66 (09-04-19 @ 12:46) (66 - 70)  BP: 127/74 (09-04-19 @ 12:46) (114/57 - 127/74)  RR: 18 (09-04-19 @ 12:46) (16 - 18)  SpO2: 97% (09-04-19 @ 12:46) (97% - 99%)    PAST MEDICAL & SURGICAL HISTORY:  Stroke  CAD (coronary artery disease)  S/P CABG (coronary artery bypass graft)      FAMILY HISTORY:  Family history of MI (myocardial infarction) (Mother)  Family history of CABG (Sibling)      SOCIAL HISTORY:  Denies smoking, drinking, or drug use    ROS:   Constitutional: No fever, weight loss or fatigue  Eyes: No eye pain, visual disturbances, or discharge  ENMT:  No difficulty hearing, tinnitus, vertigo; No sinus or throat pain  Neck: No pain or stiffness  Respiratory: No cough, wheezing, chills or hemoptysis  Cardiovascular: No chest pain, palpitations, shortness of breath, dizziness or leg swelling  Gastrointestinal: No abdominal pain. No nausea, vomiting or hematemesis; No diarrhea or constipation. Nohematochezia.  Genitourinary: No dysuria, frequency, hematuria or incontinence  Neurological: As per HPI  Skin: No itching, burning, rashes or lesions   Endocrine: No heat or cold intolerance; No hair loss  Musculoskeletal: No joint pain or swelling; No muscle, back or extremity pain  Psychiatric: No depression, anxiety, mood swings or difficulty sleeping  Heme/Lymph: No easy bruising or bleeding gums    MEDICATIONS  (STANDING):    MEDICATIONS  (PRN):    Allergies    codeine (Nausea)    Intolerances      Vital Signs Last 24 Hrs  T(C): 36.7 (04 Sep 2019 12:46), Max: 36.8 (04 Sep 2019 11:06)  T(F): 98.1 (04 Sep 2019 12:46), Max: 98.2 (04 Sep 2019 11:06)  HR: 66 (04 Sep 2019 12:46) (66 - 70)  BP: 127/74 (04 Sep 2019 12:46) (114/57 - 127/74)  BP(mean): --  RR: 18 (04 Sep 2019 12:46) (16 - 18)  SpO2: 97% (04 Sep 2019 12:46) (97% - 99%)    Physical exam:  General: No acute distress, awake and alert  Neurologic:  -Mental status: Awake, alert, oriented to person, place, and time. Speech is fluent with intact naming, repetition, and comprehension, no dysarthria. Recent and remote memory intact. Follows commands. Decreased attention/concentration. Fund of knowledge appropriate.  -Cranial nerves:   II: Visual fields are full to confrontation.  III, IV, VI: Extraocular movements are intact without nystagmus. Pupils equally round and reactive to light  V:  Facial sensation V2/3 equal and intact. Possible decrease in left V1 (per patient)   VII: Trace left facial droop  VIII: Hearing is bilaterally intact to finger rub  IX, X: Uvula is midline and soft palate rises symmetrically  XI: Head turning and shoulder shrug are intact.  XII: Tongue protrudes midline  Motor: Normal bulk and tone. No pronator drift. Strength bilateral upper extremity 5/5, bilateral lower extremities 5/5.  Sensation: Decreased in Left arm and leg. No neglect or extinction on double simultaneous testing.  Coordination: No dysmetria on finger-to-nose  Reflexes: Downgoing toes bilaterally   Gait: Narrow gait and steady    NIHSS: 2    Fingerstick Blood Glucose: CAPILLARY BLOOD GLUCOSE      POCT Blood Glucose.: 86 mg/dL (04 Sep 2019 11:09)    LABS:                        12.8   6.21  )-----------( 290      ( 04 Sep 2019 11:16 )             40.5     09-04    144  |  107  |  20  ----------------------------<  88  4.6   |  25  |  0.83    Ca    9.2      04 Sep 2019 11:16    TPro  6.7  /  Alb  4.0  /  TBili  0.3  /  DBili  x   /  AST  12  /  ALT  8<L>  /  AlkPhos  100  09-04    PT/INR - ( 04 Sep 2019 11:16 )   PT: 10.7 sec;   INR: 0.95          PTT - ( 04 Sep 2019 11:16 )  PTT:33.9 sec  CARDIAC MARKERS ( 04 Sep 2019 11:16 )  x     / <0.01 ng/mL / x     / x     / x          Urinalysis Basic - ( 04 Sep 2019 12:06 )    Color: Yellow / Appearance: Clear / SG: <=1.005 / pH: x  Gluc: x / Ketone: NEGATIVE  / Bili: Negative / Urobili: 0.2 E.U./dL   Blood: x / Protein: NEGATIVE mg/dL / Nitrite: NEGATIVE   Leuk Esterase: Trace / RBC: < 5 /HPF / WBC < 5 /HPF   Sq Epi: x / Non Sq Epi: 0-5 /HPF / Bacteria: Present /HPF        RADIOLOGY & ADDITIONAL STUDIES:  < from: CT Brain Stroke Protocol (09.04.19 @ 11:28) >  IMPRESSION:   1. No acute intracranial hemorrhage or transcortical infarct.    2. No significant interval change from prior head CT dated 5/11/2019.    < from: CT Angio Head w/ IV Cont (09.04.19 @ 11:30) >  IMPRESSION: No large vessel occlusion.    < from: CT Angio Head w/ IV Cont (09.04.19 @ 11:30) >  IMPRESSION: No significant stenosis of the internal carotid arteries per   NASCET criteria. Mild stenosis of the bilateral vertebral artery origins   which is similar prior exam.    -----------------------------------------------------------------------------------------------------------------  IV-tPA (Y/N):    No                         Reason IV-tPA not given: patient with mild symptoms    ASSESSMENT/PLAN:    89y Female w/ PMH ***. HCT showed ***. CTA showed ***. Given *** tPA was ***.         Lorna Ricketts  Neurology Stroke NP  [phone] **STROKE CODE CONSULT NOTE**  *****************INCOMPLETE******************************  Last known well time/Time of onset of symptoms: 9am 9/4/2019    HPI: 89y Female with PMHx of CAD, CABG x3, CVA (chronic left occipital lobe), spinal stenosis, sciatica s/p 3 epidural injections for Left leg pain who presents with multiple episodes of word finding difficulties and left facial numbness. 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 Yesterday patient had 2 episodes of word finding difficulty and left facial numbness at 1pm and 8pm both which resolved after several minutes. This morning she woke up in her [facility] and around 9am noticed that she was having another episode of word finding difficulty and left facial numbness when on the phone. She called her PCP Dr. Gonzalo Marvin ([phone]) who prompted her to present to the ED. In Ed patient was noted to have slight left facial and left arm/leg numbness/tingling, stroke code was called. HCT was negative for acute infarct/hemorrhage. CTA was negative for occlusion with mild bilateral vertebral artery stenosis. Patient blood pressure was 130/63 on arrival and is on DAPT at home as well as atorvastatin because of her cardiac history per Dr. Marvin. Left facial droop resolved < 15mins. Patient denies acute weakness, change in vision, dizziness, headache. Patient says that she is still having some difficulty with finding words and that she is slower than usual.     T(C): 36.7 (09-04-19 @ 12:46), Max: 36.8 (09-04-19 @ 11:06)  HR: 66 (09-04-19 @ 12:46) (66 - 70)  BP: 127/74 (09-04-19 @ 12:46) (114/57 - 127/74)  RR: 18 (09-04-19 @ 12:46) (16 - 18)  SpO2: 97% (09-04-19 @ 12:46) (97% - 99%)    PAST MEDICAL & SURGICAL HISTORY:  Stroke  CAD (coronary artery disease)  S/P CABG (coronary artery bypass graft)      FAMILY HISTORY:  Family history of MI (myocardial infarction) (Mother)  Family history of CABG (Sibling)      SOCIAL HISTORY:  Denies smoking, drinking, or drug use    ROS:   Constitutional: No fever, weight loss or fatigue  Eyes: No eye pain, visual disturbances, or discharge  ENMT:  No difficulty hearing, tinnitus, vertigo; No sinus or throat pain  Neck: No pain or stiffness  Respiratory: No cough, wheezing, chills or hemoptysis  Cardiovascular: No chest pain, palpitations, shortness of breath, dizziness or leg swelling  Gastrointestinal: No abdominal pain. No nausea, vomiting or hematemesis; No diarrhea or constipation. Nohematochezia.  Genitourinary: No dysuria, frequency, hematuria or incontinence  Neurological: As per HPI  Skin: No itching, burning, rashes or lesions   Endocrine: No heat or cold intolerance; No hair loss  Musculoskeletal: No joint pain or swelling; No muscle, back or extremity pain  Psychiatric: No depression, anxiety, mood swings or difficulty sleeping  Heme/Lymph: No easy bruising or bleeding gums    MEDICATIONS  (STANDING):  Plavix 75mg daily  atorvastatin 80mg daily  pantoprazole 40mg daily  metoprolol 25mg BID  aspirin 81mg daily    MEDICATIONS  (PRN):    Allergies    codeine (Nausea)    Intolerances      Vital Signs Last 24 Hrs  T(C): 36.7 (04 Sep 2019 12:46), Max: 36.8 (04 Sep 2019 11:06)  T(F): 98.1 (04 Sep 2019 12:46), Max: 98.2 (04 Sep 2019 11:06)  HR: 66 (04 Sep 2019 12:46) (66 - 70)  BP: 127/74 (04 Sep 2019 12:46) (114/57 - 127/74)  BP(mean): --  RR: 18 (04 Sep 2019 12:46) (16 - 18)  SpO2: 97% (04 Sep 2019 12:46) (97% - 99%)    Physical exam:  General: No acute distress, awake and alert  Neurologic:  -Mental status: Awake, alert, oriented to person, place, and time. Speech is fluent with intact naming, repetition, and comprehension, no dysarthria. Recent and remote memory intact. Follows commands. Decreased attention/concentration. Fund of knowledge appropriate.  -Cranial nerves:   II: Visual fields are full to confrontation.  III, IV, VI: Extraocular movements are intact without nystagmus. Pupils equally round and reactive to light  V:  Facial sensation V2/3 equal and intact. Possible decrease in left V1 (per patient)   VII: Trace left facial droop  VIII: Hearing is bilaterally intact to finger rub  IX, X: Uvula is midline and soft palate rises symmetrically  XI: Head turning and shoulder shrug are intact.  XII: Tongue protrudes midline  Motor: Normal bulk and tone. No pronator drift. Strength bilateral upper extremity 5/5, bilateral lower extremities 5/5.  Sensation: Decreased in Left arm and leg. No neglect or extinction on double simultaneous testing.  Coordination: No dysmetria on finger-to-nose  Reflexes: Downgoing toes bilaterally   Gait: Narrow gait and steady    NIHSS: 2    Fingerstick Blood Glucose: CAPILLARY BLOOD GLUCOSE      POCT Blood Glucose.: 86 mg/dL (04 Sep 2019 11:09)    LABS:                        12.8   6.21  )-----------( 290      ( 04 Sep 2019 11:16 )             40.5     09-04    144  |  107  |  20  ----------------------------<  88  4.6   |  25  |  0.83    Ca    9.2      04 Sep 2019 11:16    TPro  6.7  /  Alb  4.0  /  TBili  0.3  /  DBili  x   /  AST  12  /  ALT  8<L>  /  AlkPhos  100  09-04    PT/INR - ( 04 Sep 2019 11:16 )   PT: 10.7 sec;   INR: 0.95          PTT - ( 04 Sep 2019 11:16 )  PTT:33.9 sec  CARDIAC MARKERS ( 04 Sep 2019 11:16 )  x     / <0.01 ng/mL / x     / x     / x          Urinalysis Basic - ( 04 Sep 2019 12:06 )    Color: Yellow / Appearance: Clear / SG: <=1.005 / pH: x  Gluc: x / Ketone: NEGATIVE  / Bili: Negative / Urobili: 0.2 E.U./dL   Blood: x / Protein: NEGATIVE mg/dL / Nitrite: NEGATIVE   Leuk Esterase: Trace / RBC: < 5 /HPF / WBC < 5 /HPF   Sq Epi: x / Non Sq Epi: 0-5 /HPF / Bacteria: Present /HPF        RADIOLOGY & ADDITIONAL STUDIES:  < from: CT Brain Stroke Protocol (09.04.19 @ 11:28) >  IMPRESSION:   1. No acute intracranial hemorrhage or transcortical infarct.    2. No significant interval change from prior head CT dated 5/11/2019.    < from: CT Angio Head w/ IV Cont (09.04.19 @ 11:30) >  IMPRESSION: No large vessel occlusion.    < from: CT Angio Head w/ IV Cont (09.04.19 @ 11:30) >  IMPRESSION: No significant stenosis of the internal carotid arteries per   NASCET criteria. Mild stenosis of the bilateral vertebral artery origins   which is similar prior exam.    -----------------------------------------------------------------------------------------------------------------  IV-tPA (Y/N):    No                         Reason IV-tPA not given: patient with mild symptoms    ASSESSMENT/PLAN:    89y Female with PMHx of CAD, CABG x3, CVA (chronic left occipital lobe), spinal stenosis, sciatica s/p 3 epidural injections for Left leg pain who presents with multiple episodes of word finding difficulties and left facial numbness. Yesterday patient had 2 episodes of word finding difficulty and left facial numbness at 1pm and 8pm both which resolved after several minutes. This morning she woke up in her [facility] and around 9am noticed that she was having another episode of word finding difficulty and left facial numbness when on the phone. In Ed patient was noted to have slight left facial and left arm/leg numbness/tingling, stroke code was called. HCT was negative for acute infarct/hemorrhage. CTA was negative for occlusion with mild bilateral vertebral artery stenosis. Patient with rapidly improving symptoms and mild symptoms and was not given TPA, NIHSS 2. Symptoms likely TIA vs stroke less likely cervical radiculopathy, further workup warranted     - Closely follow neuro checks every 2-4 hours   - Repeat HCT for acute changes in neuro exam  - Obtain MRI Brain without contrast  - Obtain TTE without bubble  - Goal SBP < 200  - Bedside Dysphagia Screen  - PT/OT/SLP   - Obtain Hemoglobin A1C, Lipid Profile Panel, and TSH  - obtain accumetrix: P2Y12 and ASA assay (special hematology)    Secondary Stroke Prevention Medicaiton  - Continue home asa 81mg and plavix 75mg daily  - continue atorvastatin 80mg PO daily- cholesterol and stroke prevention   - Start heparin SQ and SCDs for DVT prophylaxis       Lorna Ricketts  Neurology Stroke NP  [phone] **STROKE CODE CONSULT NOTE**  Last known well time/Time of onset of symptoms: 9am 9/4/2019    HPI: 89y Female with PMHx of CAD, CABG x3, CVA (chronic left occipital lobe), spinal stenosis, sciatica s/p 3 epidural injections for Left leg pain who presents with multiple episodes of word finding difficulties and left facial numbness. 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 Yesterday patient had 2 episodes of word finding difficulty and left facial numbness at 1pm and 8pm both which resolved after several minutes. This morning she woke up in her [facility] and around 9am noticed that she was having another episode of word finding difficulty and left facial numbness when on the phone. She called her PCP Dr. Gonzalo Marvin ([phone]) who prompted her to present to the ED. In Ed patient was noted to have slight left facial and left arm/leg numbness/tingling, stroke code was called. HCT was negative for acute infarct/hemorrhage. CTA was negative for occlusion with mild bilateral vertebral artery stenosis. Patient blood pressure was 130/63 on arrival and is on DAPT at home as well as atorvastatin because of her cardiac history per Dr. Marvin. Left facial droop resolved < 15mins. Patient denies acute weakness, change in vision, dizziness, headache. Patient says that she is still having some difficulty with finding words and that she is slower than usual.     T(C): 36.7 (09-04-19 @ 12:46), Max: 36.8 (09-04-19 @ 11:06)  HR: 66 (09-04-19 @ 12:46) (66 - 70)  BP: 127/74 (09-04-19 @ 12:46) (114/57 - 127/74)  RR: 18 (09-04-19 @ 12:46) (16 - 18)  SpO2: 97% (09-04-19 @ 12:46) (97% - 99%)    PAST MEDICAL & SURGICAL HISTORY:  Stroke  CAD (coronary artery disease)  S/P CABG (coronary artery bypass graft)      FAMILY HISTORY:  Family history of MI (myocardial infarction) (Mother)  Family history of CABG (Sibling)      SOCIAL HISTORY:  Denies smoking, drinking, or drug use    ROS:   Constitutional: No fever, weight loss or fatigue  Eyes: No eye pain, visual disturbances, or discharge  ENMT:  No difficulty hearing, tinnitus, vertigo; No sinus or throat pain  Neck: No pain or stiffness  Respiratory: No cough, wheezing, chills or hemoptysis  Cardiovascular: No chest pain, palpitations, shortness of breath, dizziness or leg swelling  Gastrointestinal: No abdominal pain. No nausea, vomiting or hematemesis; No diarrhea or constipation. Nohematochezia.  Genitourinary: No dysuria, frequency, hematuria or incontinence  Neurological: As per HPI  Skin: No itching, burning, rashes or lesions   Endocrine: No heat or cold intolerance; No hair loss  Musculoskeletal: No joint pain or swelling; No muscle, back or extremity pain  Psychiatric: No depression, anxiety, mood swings or difficulty sleeping  Heme/Lymph: No easy bruising or bleeding gums    MEDICATIONS  (STANDING):  Plavix 75mg daily  atorvastatin 80mg daily  pantoprazole 40mg daily  metoprolol 25mg BID  aspirin 81mg daily    MEDICATIONS  (PRN):    Allergies  codeine (Nausea)    Vital Signs Last 24 Hrs  T(C): 36.7 (04 Sep 2019 12:46), Max: 36.8 (04 Sep 2019 11:06)  T(F): 98.1 (04 Sep 2019 12:46), Max: 98.2 (04 Sep 2019 11:06)  HR: 66 (04 Sep 2019 12:46) (66 - 70)  BP: 127/74 (04 Sep 2019 12:46) (114/57 - 127/74)  RR: 18 (04 Sep 2019 12:46) (16 - 18)  SpO2: 97% (04 Sep 2019 12:46) (97% - 99%)    Physical exam:  General: No acute distress, awake and alert  Neurologic:  -Mental status: Awake, alert, oriented to person, place, and time. Speech is fluent with intact naming, repetition, and comprehension, no dysarthria. Recent and remote memory intact. Follows commands. Decreased attention/concentration. Fund of knowledge appropriate.  -Cranial nerves:   II: Visual fields are full to confrontation.  III, IV, VI: Extraocular movements are intact without nystagmus. Pupils equally round and reactive to light  V:  Facial sensation V2/3 equal and intact. Possible decrease in left V1 (per patient)   VII: Trace left facial droop  VIII: Hearing is bilaterally intact to finger rub  IX, X: Uvula is midline and soft palate rises symmetrically  XI: Head turning and shoulder shrug are intact.  XII: Tongue protrudes midline  Motor: Normal bulk and tone. No pronator drift. Strength bilateral upper extremity 5/5, bilateral lower extremities 5/5.  Sensation: Decreased in Left arm and leg. No neglect or extinction on double simultaneous testing.  Coordination: No dysmetria on finger-to-nose  Reflexes: Downgoing toes bilaterally   Gait: Narrow gait and steady    NIHSS: 2    Fingerstick Blood Glucose: CAPILLARY BLOOD GLUCOSE      POCT Blood Glucose.: 86 mg/dL (04 Sep 2019 11:09)    LABS:                        12.8   6.21  )-----------( 290      ( 04 Sep 2019 11:16 )             40.5     09-04    144  |  107  |  20  ----------------------------<  88  4.6   |  25  |  0.83    Ca    9.2      04 Sep 2019 11:16    TPro  6.7  /  Alb  4.0  /  TBili  0.3  /  DBili  x   /  AST  12  /  ALT  8<L>  /  AlkPhos  100  09-04    PT/INR - ( 04 Sep 2019 11:16 )   PT: 10.7 sec;   INR: 0.95          PTT - ( 04 Sep 2019 11:16 )  PTT:33.9 sec  CARDIAC MARKERS ( 04 Sep 2019 11:16 )  x     / <0.01 ng/mL / x     / x     / x          Urinalysis Basic - ( 04 Sep 2019 12:06 )    Color: Yellow / Appearance: Clear / SG: <=1.005 / pH: x  Gluc: x / Ketone: NEGATIVE  / Bili: Negative / Urobili: 0.2 E.U./dL   Blood: x / Protein: NEGATIVE mg/dL / Nitrite: NEGATIVE   Leuk Esterase: Trace / RBC: < 5 /HPF / WBC < 5 /HPF   Sq Epi: x / Non Sq Epi: 0-5 /HPF / Bacteria: Present /HPF        RADIOLOGY & ADDITIONAL STUDIES:  < from: CT Brain Stroke Protocol (09.04.19 @ 11:28) >  IMPRESSION:   1. No acute intracranial hemorrhage or transcortical infarct.    2. No significant interval change from prior head CT dated 5/11/2019.    < from: CT Angio Head w/ IV Cont (09.04.19 @ 11:30) >  IMPRESSION: No large vessel occlusion.    < from: CT Angio Head w/ IV Cont (09.04.19 @ 11:30) >  IMPRESSION: No significant stenosis of the internal carotid arteries per   NASCET criteria. Mild stenosis of the bilateral vertebral artery origins   which is similar prior exam.    -----------------------------------------------------------------------------------------------------------------  IV-tPA (Y/N):    No                         Reason IV-tPA not given: patient with mild symptoms    ASSESSMENT/PLAN:    89y Female with PMHx of CAD, CABG x3, CVA (chronic left occipital lobe), spinal stenosis, sciatica s/p 3 epidural injections for Left leg pain who presents with multiple episodes of word finding difficulties and left facial numbness. Yesterday patient had 2 episodes of word finding difficulty and left facial numbness at 1pm and 8pm both which resolved after several minutes. This morning she woke up in her [facility] and around 9am noticed that she was having another episode of word finding difficulty and left facial numbness when on the phone. In Ed patient was noted to have slight left facial and left arm/leg numbness/tingling, stroke code was called. HCT was negative for acute infarct/hemorrhage. CTA was negative for occlusion with mild bilateral vertebral artery stenosis. Patient with rapidly improving symptoms and mild symptoms and was not given TPA, NIHSS 2. Symptoms likely TIA vs stroke less likely cervical radiculopathy, further workup warranted     - Closely follow neuro checks every 2-4 hours   - Repeat HCT for acute changes in neuro exam  - Obtain MRI Brain without contrast  - Obtain TTE without bubble  - Goal SBP < 200  - Bedside Dysphagia Screen  - PT/OT/SLP   - Obtain Hemoglobin A1C, Lipid Profile Panel, and TSH  - obtain accumetrix: P2Y12 and ASA assay (special hematology)    Secondary Stroke Prevention Medicaiton  - Continue home asa 81mg and plavix 75mg daily  - continue atorvastatin 80mg PO daily- cholesterol and stroke prevention   - Start heparin SQ and SCDs for DVT prophylaxis       Lorna Ricketts  Neurology Stroke NP  [phone]

## 2019-09-04 NOTE — H&P ADULT - PROBLEM SELECTOR PLAN 1
- Closely follow neuro checks every 2-4 hours   - Repeat HCT for acute changes in neuro exam  - f/u MRI Brain without contrast  - f/u TTE without bubble  - Goal SBP < 200  - Bedside Dysphagia Screen  - PT/OT/SLP

## 2019-09-04 NOTE — ED ADULT NURSE NOTE - CAS TRG GEN SKIN COLOR
----- Message from Jennifer Knight MD sent at 1/15/2018  2:04 PM CST -----  Thyroid function and potassium were normal.  
Called patient and relayed MD note below. Patient verbalized understanding    
Normal for race

## 2019-09-04 NOTE — H&P ADULT - HISTORY OF PRESENT ILLNESS
89 year old F with PMHx of CAD, CABG x3, CVA (chronic left occipital lobe), spinal stenosis, sciatica s/p 3 epidural injections for Left leg pain who presents with multiple episodes of word finding difficulties and left facial numbness. Patient arrived to [facility] by cab after she had concerns of 2 episodes of word finding difficulty and left facial numbness yesterday at 1pm and 8pm both which resolved after several minutes. This morning she woke up with similar symptoms of having another episode of word finding difficulty and left facial numbness. She reports that she has had similar symptoms in the past but this time she felt generalized weakness. Patient denies headache, dizziness, vision changes, chest pain, abdominal pain, dysuria, hematuria, diarrhea, constipation. 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 She is a former smoker but has not smoked in the last 30 years.    In the ED: T: 98, HR 66-70, -127/57-74, RR 16-18, SpO2 92-99 RA. Labs significant only for elevated triglycerides 174. CTH negative. CTA head showed significant stenosis of the internal carotid arteries per NASCET criteria. Mild stenosis of the bilateral vertebral artery origins. CT brain stroke protocol showed no acute intracranial hemorrhage or transcortical infarct. 89 year old F with PMHx of CAD, CABG x3, CVA (chronic left occipital lobe), spinal stenosis, sciatica s/p 3 epidural injections for Left leg pain who presents with multiple episodes of word finding difficulties and left facial numbness. Patient arrived to [facility] by cab after she had concerns of 2 episodes of word finding difficulty and left facial numbness yesterday at 1pm and 8pm both which resolved after several minutes. This morning she woke up with similar symptoms of having another episode of word finding difficulty and left facial numbness. She reports that she has had similar symptoms in the past but this time she felt generalized weakness. Patient denies headache, dizziness, vision changes, chest pain, abdominal pain, dysuria, hematuria, diarrhea, constipation. 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 She is a former smoker but has not smoked in the last 30 years.    In the ED: T: 98, HR 66-70, -127/57-74, RR 16-18, SpO2 92-99 RA. Labs significant only for elevated triglycerides 174. CTH negative. CTA head showed significant stenosis of the internal carotid arteries per NASCET criteria. Mild stenosis of the bilateral vertebral artery origins. CT brain stroke protocol showed no acute intracranial hemorrhage or transcortical infarct.   Patient being admitted to [facility] to r/o stroke and further monitoring

## 2019-09-04 NOTE — ED ADULT NURSE NOTE - OBJECTIVE STATEMENT
Pt presents to ED via walk-in triage with c/o L facial and arm numbness, intermittent occurring twice yesterday and resolving, also notes it happened again this morning. Pt states she also has been having trouble finding her words, followed with stating she is feeling anxious. Denies CP/SOB/HA/dizziness/nv. Reports decreased sensation in her L arm and leg, L side of face, with a minor L side facial droop noted. Stroke code called, pt transferred to CT scan and evaluated by neurology.

## 2019-09-04 NOTE — H&P ADULT - NSICDXFAMILYHX_GEN_ALL_CORE_FT
FAMILY HISTORY:  Mother  Still living? Unknown  Family history of MI (myocardial infarction), Age at diagnosis: Age Unknown    Sibling  Still living? Unknown  Family history of CABG, Age at diagnosis: Age Unknown

## 2019-09-04 NOTE — H&P ADULT - PROBLEM SELECTOR PLAN 2
History of CAD, CABG x3. On home aspirin an dplavix  - c/w aspirin 81  - c/w plavix 75  - Obtain Hemoglobin A1C, Lipid Profile Panel, and TSH  - obtain accumetrix: P2Y12 and ASA assay (special hematology)

## 2019-09-04 NOTE — ED PROVIDER NOTE - PHYSICAL EXAMINATION
Mild L facial droop versus chronic asymmetry. Cranial nerves normal. Motor and sensory normal. Finger to nose is normal. Gait normal. Possible expressive aphasia versus anxiety. Patient with extremely anxious affect. Unclear whether aphasia is true versus slow response secondary to anxiety.

## 2019-09-04 NOTE — H&P ADULT - ATTENDING COMMENTS
Patient seen and examined with [facility] team.  Agree with above.  She c/o of left sensory deficit involving face, arm and leg - worse than known related to spinal etiology.  Concern for stroke given her history of CAD and CVA so workup including MRI Brain without [place], echocardiogram, PT/OT evaluation  Continue Aspirin and Plavix, her home medications, but send accumetrics to test if they're therapeutic.  Permissive HTN while workup in progress.  DVT prophylaxis

## 2019-09-05 DIAGNOSIS — R73.03 PREDIABETES: ICD-10-CM

## 2019-09-05 DIAGNOSIS — E66.3 OVERWEIGHT: ICD-10-CM

## 2019-09-05 DIAGNOSIS — I63.9 CEREBRAL INFARCTION, UNSPECIFIED: ICD-10-CM

## 2019-09-05 LAB
ALBUMIN SERPL ELPH-MCNC: 3.9 G/DL — SIGNIFICANT CHANGE UP (ref 3.3–5)
ALP SERPL-CCNC: 97 U/L — SIGNIFICANT CHANGE UP (ref 40–120)
ALT FLD-CCNC: 7 U/L — LOW (ref 10–45)
ANION GAP SERPL CALC-SCNC: 13 MMOL/L — SIGNIFICANT CHANGE UP (ref 5–17)
AST SERPL-CCNC: 13 U/L — SIGNIFICANT CHANGE UP (ref 10–40)
BILIRUB SERPL-MCNC: 0.5 MG/DL — SIGNIFICANT CHANGE UP (ref 0.2–1.2)
BUN SERPL-MCNC: 16 MG/DL — SIGNIFICANT CHANGE UP (ref 7–23)
CALCIUM SERPL-MCNC: 9.6 MG/DL — SIGNIFICANT CHANGE UP (ref 8.4–10.5)
CHLORIDE SERPL-SCNC: 107 MMOL/L — SIGNIFICANT CHANGE UP (ref 96–108)
CHOLEST SERPL-MCNC: 166 MG/DL — SIGNIFICANT CHANGE UP (ref 10–199)
CO2 SERPL-SCNC: 24 MMOL/L — SIGNIFICANT CHANGE UP (ref 22–31)
CREAT SERPL-MCNC: 0.76 MG/DL — SIGNIFICANT CHANGE UP (ref 0.5–1.3)
CULTURE RESULTS: SIGNIFICANT CHANGE UP
GLUCOSE SERPL-MCNC: 100 MG/DL — HIGH (ref 70–99)
HBA1C BLD-MCNC: 6.1 % — HIGH (ref 4–5.6)
HCT VFR BLD CALC: 41.2 % — SIGNIFICANT CHANGE UP (ref 34.5–45)
HDLC SERPL-MCNC: 54 MG/DL — SIGNIFICANT CHANGE UP
HGB BLD-MCNC: 12.9 G/DL — SIGNIFICANT CHANGE UP (ref 11.5–15.5)
LIPID PNL WITH DIRECT LDL SERPL: 81 MG/DL — SIGNIFICANT CHANGE UP
MAGNESIUM SERPL-MCNC: 2.1 MG/DL — SIGNIFICANT CHANGE UP (ref 1.6–2.6)
MCHC RBC-ENTMCNC: 29.8 PG — SIGNIFICANT CHANGE UP (ref 27–34)
MCHC RBC-ENTMCNC: 31.3 GM/DL — LOW (ref 32–36)
MCV RBC AUTO: 95.2 FL — SIGNIFICANT CHANGE UP (ref 80–100)
NRBC # BLD: 0 /100 WBCS — SIGNIFICANT CHANGE UP (ref 0–0)
PLATELET # BLD AUTO: 255 K/UL — SIGNIFICANT CHANGE UP (ref 150–400)
PLATELET RESPONSE ASPIRIN RESULT: 585 ARU — SIGNIFICANT CHANGE UP (ref 350–700)
POTASSIUM SERPL-MCNC: 4.1 MMOL/L — SIGNIFICANT CHANGE UP (ref 3.5–5.3)
POTASSIUM SERPL-SCNC: 4.1 MMOL/L — SIGNIFICANT CHANGE UP (ref 3.5–5.3)
PROT SERPL-MCNC: 6.7 G/DL — SIGNIFICANT CHANGE UP (ref 6–8.3)
RBC # BLD: 4.33 M/UL — SIGNIFICANT CHANGE UP (ref 3.8–5.2)
RBC # FLD: 13.3 % — SIGNIFICANT CHANGE UP (ref 10.3–14.5)
SODIUM SERPL-SCNC: 144 MMOL/L — SIGNIFICANT CHANGE UP (ref 135–145)
SPECIMEN SOURCE: SIGNIFICANT CHANGE UP
TOTAL CHOLESTEROL/HDL RATIO MEASUREMENT: 3.1 RATIO — LOW (ref 3.3–7.1)
TRIGL SERPL-MCNC: 153 MG/DL — HIGH (ref 10–149)
TSH SERPL-MCNC: 4.2 UIU/ML — SIGNIFICANT CHANGE UP (ref 0.35–4.94)
WBC # BLD: 6.06 K/UL — SIGNIFICANT CHANGE UP (ref 3.8–10.5)
WBC # FLD AUTO: 6.06 K/UL — SIGNIFICANT CHANGE UP (ref 3.8–10.5)

## 2019-09-05 PROCEDURE — 99223 1ST HOSP IP/OBS HIGH 75: CPT | Mod: GC

## 2019-09-05 PROCEDURE — 99233 SBSQ HOSP IP/OBS HIGH 50: CPT

## 2019-09-05 PROCEDURE — 93010 ELECTROCARDIOGRAM REPORT: CPT

## 2019-09-05 RX ORDER — ASPIRIN/CALCIUM CARB/MAGNESIUM 324 MG
162 TABLET ORAL DAILY
Refills: 0 | Status: DISCONTINUED | OUTPATIENT
Start: 2019-09-06 | End: 2019-09-06

## 2019-09-05 RX ORDER — SODIUM CHLORIDE 0.65 %
1 AEROSOL, SPRAY (ML) NASAL
Refills: 0 | Status: DISCONTINUED | OUTPATIENT
Start: 2019-09-05 | End: 2019-09-08

## 2019-09-05 RX ORDER — ASPIRIN/CALCIUM CARB/MAGNESIUM 324 MG
81 TABLET ORAL DAILY
Refills: 0 | Status: COMPLETED | OUTPATIENT
Start: 2019-09-05 | End: 2019-09-05

## 2019-09-05 RX ORDER — ZALEPLON 10 MG
5 CAPSULE ORAL ONCE
Refills: 0 | Status: DISCONTINUED | OUTPATIENT
Start: 2019-09-05 | End: 2019-09-05

## 2019-09-05 RX ADMIN — ATORVASTATIN CALCIUM 80 MILLIGRAM(S): 80 TABLET, FILM COATED ORAL at 22:00

## 2019-09-05 RX ADMIN — Medication 81 MILLIGRAM(S): at 18:49

## 2019-09-05 RX ADMIN — CLOPIDOGREL BISULFATE 75 MILLIGRAM(S): 75 TABLET, FILM COATED ORAL at 13:04

## 2019-09-05 RX ADMIN — Medication 5 MILLIGRAM(S): at 22:25

## 2019-09-05 RX ADMIN — Medication 5 MILLIGRAM(S): at 00:15

## 2019-09-05 RX ADMIN — HEPARIN SODIUM 5000 UNIT(S): 5000 INJECTION INTRAVENOUS; SUBCUTANEOUS at 22:00

## 2019-09-05 RX ADMIN — HEPARIN SODIUM 5000 UNIT(S): 5000 INJECTION INTRAVENOUS; SUBCUTANEOUS at 06:11

## 2019-09-05 RX ADMIN — HEPARIN SODIUM 5000 UNIT(S): 5000 INJECTION INTRAVENOUS; SUBCUTANEOUS at 13:04

## 2019-09-05 RX ADMIN — PANTOPRAZOLE SODIUM 40 MILLIGRAM(S): 20 TABLET, DELAYED RELEASE ORAL at 06:11

## 2019-09-05 RX ADMIN — Medication 81 MILLIGRAM(S): at 17:38

## 2019-09-05 NOTE — OCCUPATIONAL THERAPY INITIAL EVALUATION ADULT - GROSSLY INTACT, SENSORY
Grossly Intact/Left UE/Right LE/Right UE/Patient reports mildly decreased sensation on L cheek/Left LE

## 2019-09-05 NOTE — OCCUPATIONAL THERAPY INITIAL EVALUATION ADULT - STANDING BALANCE: DYNAMIC, REHAB EVAL
fair balance/Patient ambulated ~60ft with HHA and close supervision, decreased balance noted during turns but no LOB

## 2019-09-05 NOTE — SPEECH LANGUAGE PATHOLOGY EVALUATION - SPECIFY REASON(S)
SLP consulted to assess speech and language skills d/t reported word-finding difficulties upon admission.

## 2019-09-05 NOTE — PROGRESS NOTE ADULT - SUBJECTIVE AND OBJECTIVE BOX
OVERNIGHT EVENTS: GAUDENCIO    SUBJECTIVE / INTERVAL HPI: Patient seen and examined at bedside. This Am patient complaining of L eye pain/discomfort, which she reports is chronic. Denies HA, chest pain, SOB, abdominal pain, diarrhea, constipation, melena, hematochezia, hematuria, dysuria    VITAL SIGNS:  Vital Signs Last 24 Hrs  T(C): 36.9 (05 Sep 2019 14:00), Max: 36.9 (04 Sep 2019 20:11)  T(F): 98.4 (05 Sep 2019 14:00), Max: 98.5 (05 Sep 2019 01:26)  HR: 80 (05 Sep 2019 12:32) (63 - 80)  BP: 127/66 (05 Sep 2019 12:32) (117/59 - 155/67)  BP(mean): 90 (05 Sep 2019 12:32) (86 - 109)  RR: 18 (05 Sep 2019 12:32) (17 - 18)  SpO2: 94% (05 Sep 2019 12:32) (93% - 97%)        Physical exam:  General: No acute distress, awake and alert  Cardiovascular: Regular rate and rhythm, no murmurs  Pulmonary: Anterior breath sounds clear bilaterally, no crackles or wheezing. No use of accessory muscles  Extremities: warm and well eprfused, radial and DP pulses +2, no edema    Neurologic:  -Mental status: Awake, alert, oriented to person, place, and time. Speech is fluent with intact naming, repetition, and comprehension, no dysarthria. Recent and remote memory intact. Follows commands. Attention/concentration intact. Fund of knowledge appropriate.  -Cranial nerves:   II: Visual fields are full to confrontation.  III, IV, VI: Extraocular movements are intact without nystagmus. Pupils equally round and reactive to light  V:  Facial sensation V1-V3 equal and intact   VII: Face is symmetric with normal eye closure and smile  VIII: Hearing is bilaterally intact to finger rub  IX, X: Uvula is midline and soft palate rises symmetrically  XI: Head turning and shoulder shrug are intact.  XII: Tongue protrudes midline  Motor: Normal bulk and tone. No pronator drift. Strength bilateral upper extremity 5/5, bilateral lower extremities 5/5.  Rapid alternating movements intact and symmetric  Sensation: Intact to light touch bilaterally. No neglect or extinction on double simultaneous testing.  Coordination: No dysmetria on finger-to-nose and heel-to-shin bilaterally  Gait: patient requires assistance with walking      MEDICATIONS  (STANDING):  aspirin enteric coated 81 milliGRAM(s) Oral every 24 hours  atorvastatin 80 milliGRAM(s) Oral at bedtime  clopidogrel Tablet 75 milliGRAM(s) Oral daily  heparin  Injectable 5000 Unit(s) SubCutaneous every 8 hours  influenza   Vaccine 0.5 milliLiter(s) IntraMuscular once  pantoprazole    Tablet 40 milliGRAM(s) Oral before breakfast    MEDICATIONS  (PRN):  artificial tears (preservative free) Ophthalmic Solution 1 Drop(s) Both EYES two times a day PRN Dry Eyes    Allergies    codeine (Nausea)    Intolerances        LABS:                        12.9   6.06  )-----------( 255      ( 05 Sep 2019 07:28 )             41.2     09-05    144  |  107  |  16  ----------------------------<  100<H>  4.1   |  24  |  0.76    Ca    9.6      05 Sep 2019 07:28  Mg     2.1     09-05    TPro  6.7  /  Alb  3.9  /  TBili  0.5  /  DBili  x   /  AST  13  /  ALT  7<L>  /  AlkPhos  97  09-05    PT/INR - ( 04 Sep 2019 11:16 )   PT: 10.7 sec;   INR: 0.95          PTT - ( 04 Sep 2019 11:16 )  PTT:33.9 sec  Urinalysis Basic - ( 04 Sep 2019 12:06 )    Color: Yellow / Appearance: Clear / SG: <=1.005 / pH: x  Gluc: x / Ketone: NEGATIVE  / Bili: Negative / Urobili: 0.2 E.U./dL   Blood: x / Protein: NEGATIVE mg/dL / Nitrite: NEGATIVE   Leuk Esterase: Trace / RBC: < 5 /HPF / WBC < 5 /HPF   Sq Epi: x / Non Sq Epi: 0-5 /HPF / Bacteria: Present /HPF      CAPILLARY BLOOD GLUCOSE      POCT Blood Glucose.: 86 mg/dL (04 Sep 2019 11:09)          RADIOLOGY & ADDITIONAL TESTS: Reviewed.

## 2019-09-05 NOTE — CONSULT NOTE ADULT - PROBLEM SELECTOR RECOMMENDATION 9
Imaging as above  -C/w treatment as per stroke team recs  -ASA, Plavix, Statin  -Pending MRI head w/o contrast

## 2019-09-05 NOTE — OCCUPATIONAL THERAPY INITIAL EVALUATION ADULT - ADDITIONAL COMMENTS
Patient reports independence with ADLs PTA, uses a SC for outdoor use, also owns a RW, rollator, and quad cane but denies current use. Patient reports 6 falls in past 4 months. Patient reports owning a Life Alert bell but denies consistent use.

## 2019-09-05 NOTE — CONSULT NOTE ADULT - SUBJECTIVE AND OBJECTIVE BOX
Patient is a 89y old  Female who presents with a chief complaint of r/o stroke (05 Sep 2019 09:12)      HPI:  89 year old F with PMHx of CAD, CABG x3, CVA (chronic left occipital lobe), spinal stenosis, sciatica s/p 3 epidural injections for Left leg pain who presents with multiple episodes of word finding difficulties and left facial numbness. Patient arrived to [facility] by cab after she had concerns of 2 episodes of word finding difficulty and left facial numbness yesterday at 1pm and 8pm both which resolved after several minutes. This morning she woke up with similar symptoms of having another episode of word finding difficulty and left facial numbness. She reports that she has had similar symptoms in the past but this time she felt generalized weakness. Patient denies headache, dizziness, vision changes, chest pain, abdominal pain, dysuria, hematuria, diarrhea, constipation. 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 She is a former smoker but has not smoked in the last 30 years.    In the ED: T: 98, HR 66-70, -127/57-74, RR 16-18, SpO2 92-99 RA. Labs significant only for elevated triglycerides 174. CTH negative. CTA head showed significant stenosis of the internal carotid arteries per NASCET criteria. Mild stenosis of the bilateral vertebral artery origins. CT brain stroke protocol showed no acute intracranial hemorrhage or transcortical infarct.   Patient being admitted to [facility] to r/o stroke and further monitoring (04 Sep 2019 17:58)      Allergies    codeine (Nausea)    Intolerances    Home medications:     MEDICATIONS  (STANDING):  aspirin enteric coated 81 milliGRAM(s) Oral every 24 hours  atorvastatin 80 milliGRAM(s) Oral at bedtime  clopidogrel Tablet 75 milliGRAM(s) Oral daily  heparin  Injectable 5000 Unit(s) SubCutaneous every 8 hours  influenza   Vaccine 0.5 milliLiter(s) IntraMuscular once  pantoprazole    Tablet 40 milliGRAM(s) Oral before breakfast    MEDICATIONS  (PRN):  artificial tears (preservative free) Ophthalmic Solution 1 Drop(s) Both EYES two times a day PRN Dry Eyes      Drug Dosing Weight  Height (cm): 152.4 (05 Sep 2019 14:04)  Weight (kg): 61.4 (04 Sep 2019 11:30)  BMI (kg/m2): 26.4 (05 Sep 2019 14:04)  BSA (m2): 1.58 (05 Sep 2019 14:04)    PAST MEDICAL & SURGICAL HISTORY:  Stroke  CAD (coronary artery disease)  S/P CABG (coronary artery bypass graft)      FAMILY HISTORY:  Family history of MI (myocardial infarction) (Mother)  Family history of CABG (Sibling)    SOCIAL HISTORY: not a current smoker    ICU Vital Signs Last 24 Hrs  T(C): 36.9 (05 Sep 2019 14:00), Max: 36.9 (04 Sep 2019 20:11)  T(F): 98.4 (05 Sep 2019 14:00), Max: 98.5 (05 Sep 2019 01:26)  HR: 80 (05 Sep 2019 12:32) (63 - 80)  BP: 127/66 (05 Sep 2019 12:32) (117/59 - 155/67)  BP(mean): 90 (05 Sep 2019 12:32) (86 - 109)  ABP: --  ABP(mean): --  RR: 18 (05 Sep 2019 12:32) (17 - 18)  SpO2: 94% (05 Sep 2019 12:32) (93% - 97%)          I&O's Detail      PHYSICAL EXAM:      Constitutional:    Eyes:    ENMT:    Neck:    Breasts:    Back:    Respiratory:    Cardiovascular:    Gastrointestinal:    Genitourinary:    Rectal:    Extremities:    Vascular:    Neurological:    Skin:    Lymph Nodes:    Musculoskeletal:    Psychiatric:        LABS:  CBC Full  -  ( 05 Sep 2019 07:28 )  WBC Count : 6.06 K/uL  RBC Count : 4.33 M/uL  Hemoglobin : 12.9 g/dL  Hematocrit : 41.2 %  Platelet Count - Automated : 255 K/uL  Mean Cell Volume : 95.2 fl  Mean Cell Hemoglobin : 29.8 pg  Mean Cell Hemoglobin Concentration : 31.3 gm/dL    09-05    144  |  107  |  16  ----------------------------<  100<H>  4.1   |  24  |  0.76    Ca    9.6      05 Sep 2019 07:28  Mg     2.1     09-05    TPro  6.7  /  Alb  3.9  /  TBili  0.5  /  DBili  x   /  AST  13  /  ALT  7<L>  /  AlkPhos  97  09-05    CAPILLARY BLOOD GLUCOSE      POCT Blood Glucose.: 86 mg/dL (04 Sep 2019 11:09)    PT/INR - ( 04 Sep 2019 11:16 )   PT: 10.7 sec;   INR: 0.95          PTT - ( 04 Sep 2019 11:16 )  PTT:33.9 sec  Urinalysis Basic - ( 04 Sep 2019 12:06 )    Color: Yellow / Appearance: Clear / SG: <=1.005 / pH: x  Gluc: x / Ketone: NEGATIVE  / Bili: Negative / Urobili: 0.2 E.U./dL   Blood: x / Protein: NEGATIVE mg/dL / Nitrite: NEGATIVE   Leuk Esterase: Trace / RBC: < 5 /HPF / WBC < 5 /HPF   Sq Epi: x / Non Sq Epi: 0-5 /HPF / Bacteria: Present /HPF        EKG:    ECHO, US:   1. Normal left and right ventricular size and systolic function.   2. Grade I left ventricular diastolic dysfunction without elevated   filling pressure.   3. Moderate tricuspid regurgitation.   4. Mild pulmonic regurgitation.   5. No pericardial effusion.  RADIOLOGY:  CTA head/neck: No significant stenosis of the internal carotid arteries per NASCET criteria. Mild stenosis of the bilateral vertebral artery origins which is similar prior exam.    CT perfusion: Negative CT perfusion study.    CT head w/o contrast: 1. No acute intracranial hemorrhage or transcortical infarct. 2. No significant interval change from prior head CT dated 5/11/2019. HPI:  Brief Hospital Course:  Pt is a 89 year old F with PMHx of CAD, CABG x3, CVA (chronic left occipital lobe), spinal stenosis, sciatica s/p 3 epidural injections for Left leg pain who presents with multiple episodes of word finding difficulties and left facial numbness. On arrival to [facility] CTH negative. CTA head showed significant stenosis of the internal carotid arteries per NASCET criteria. Mild stenosis of the bilateral vertebral artery origins. CT brain stroke protocol showed no acute intracranial hemorrhage or transcortical infarct.     Subjective:      Allergies    codeine (Nausea)    Intolerances    Home medications:     MEDICATIONS  (STANDING):  aspirin enteric coated 81 milliGRAM(s) Oral every 24 hours  atorvastatin 80 milliGRAM(s) Oral at bedtime  clopidogrel Tablet 75 milliGRAM(s) Oral daily  heparin  Injectable 5000 Unit(s) SubCutaneous every 8 hours  influenza   Vaccine 0.5 milliLiter(s) IntraMuscular once  pantoprazole    Tablet 40 milliGRAM(s) Oral before breakfast    MEDICATIONS  (PRN):  artificial tears (preservative free) Ophthalmic Solution 1 Drop(s) Both EYES two times a day PRN Dry Eyes      Drug Dosing Weight  Height (cm): 152.4 (05 Sep 2019 14:04)  Weight (kg): 61.4 (04 Sep 2019 11:30)  BMI (kg/m2): 26.4 (05 Sep 2019 14:04)  BSA (m2): 1.58 (05 Sep 2019 14:04)    PAST MEDICAL & SURGICAL HISTORY:  Stroke  CAD (coronary artery disease)  S/P CABG (coronary artery bypass graft)      FAMILY HISTORY:  Family history of MI (myocardial infarction) (Mother)  Family history of CABG (Sibling)    SOCIAL HISTORY: not a current smoker    ICU Vital Signs Last 24 Hrs  T(C): 36.9 (05 Sep 2019 14:00), Max: 36.9 (04 Sep 2019 20:11)  T(F): 98.4 (05 Sep 2019 14:00), Max: 98.5 (05 Sep 2019 01:26)  HR: 80 (05 Sep 2019 12:32) (63 - 80)  BP: 127/66 (05 Sep 2019 12:32) (117/59 - 155/67)  BP(mean): 90 (05 Sep 2019 12:32) (86 - 109)  ABP: --  ABP(mean): --  RR: 18 (05 Sep 2019 12:32) (17 - 18)  SpO2: 94% (05 Sep 2019 12:32) (93% - 97%)          I&O's Detail      PHYSICAL EXAM:      Constitutional:    Eyes:    ENMT:    Neck:    Breasts:    Back:    Respiratory:    Cardiovascular:    Gastrointestinal:    Genitourinary:    Rectal:    Extremities:    Vascular:    Neurological:    Skin:    Lymph Nodes:    Musculoskeletal:    Psychiatric:        LABS:  CBC Full  -  ( 05 Sep 2019 07:28 )  WBC Count : 6.06 K/uL  RBC Count : 4.33 M/uL  Hemoglobin : 12.9 g/dL  Hematocrit : 41.2 %  Platelet Count - Automated : 255 K/uL  Mean Cell Volume : 95.2 fl  Mean Cell Hemoglobin : 29.8 pg  Mean Cell Hemoglobin Concentration : 31.3 gm/dL    09-05    144  |  107  |  16  ----------------------------<  100<H>  4.1   |  24  |  0.76    Ca    9.6      05 Sep 2019 07:28  Mg     2.1     09-05    TPro  6.7  /  Alb  3.9  /  TBili  0.5  /  DBili  x   /  AST  13  /  ALT  7<L>  /  AlkPhos  97  09-05    CAPILLARY BLOOD GLUCOSE      POCT Blood Glucose.: 86 mg/dL (04 Sep 2019 11:09)    PT/INR - ( 04 Sep 2019 11:16 )   PT: 10.7 sec;   INR: 0.95          PTT - ( 04 Sep 2019 11:16 )  PTT:33.9 sec  Urinalysis Basic - ( 04 Sep 2019 12:06 )    Color: Yellow / Appearance: Clear / SG: <=1.005 / pH: x  Gluc: x / Ketone: NEGATIVE  / Bili: Negative / Urobili: 0.2 E.U./dL   Blood: x / Protein: NEGATIVE mg/dL / Nitrite: NEGATIVE   Leuk Esterase: Trace / RBC: < 5 /HPF / WBC < 5 /HPF   Sq Epi: x / Non Sq Epi: 0-5 /HPF / Bacteria: Present /HPF        EKG:    ECHO, US:   1. Normal left and right ventricular size and systolic function.   2. Grade I left ventricular diastolic dysfunction without elevated   filling pressure.   3. Moderate tricuspid regurgitation.   4. Mild pulmonic regurgitation.   5. No pericardial effusion.  RADIOLOGY:  CTA head/neck: No significant stenosis of the internal carotid arteries per NASCET criteria. Mild stenosis of the bilateral vertebral artery origins which is similar prior exam.    CT perfusion: Negative CT perfusion study.    CT head w/o contrast: 1. No acute intracranial hemorrhage or transcortical infarct. 2. No significant interval change from prior head CT dated 5/11/2019. HPI:  Brief Hospital Course:  Pt is a 89 year old F with PMHx of CAD, CABG x3, CVA (chronic left occipital lobe), spinal stenosis, sciatica s/p 3 epidural injections for Left leg pain who presents with multiple episodes of word finding difficulties and left facial numbness. On arrival to [facility] CTH negative. CTA head showed significant stenosis of the internal carotid arteries per NASCET criteria. Mild stenosis of the bilateral vertebral artery origins. CT brain stroke protocol showed no acute intracranial hemorrhage or transcortical infarct.     Subjective:  Today pt feels that her symptoms have improved. 12 pt ROS is negative.    Allergies    codeine (Nausea)    Intolerances    Home medications: Plavix, Atorvastatin, Protonix, Metoprolol tartrate, ASA    MEDICATIONS  (STANDING):  aspirin enteric coated 81 milliGRAM(s) Oral every 24 hours  atorvastatin 80 milliGRAM(s) Oral at bedtime  clopidogrel Tablet 75 milliGRAM(s) Oral daily  heparin  Injectable 5000 Unit(s) SubCutaneous every 8 hours  influenza   Vaccine 0.5 milliLiter(s) IntraMuscular once  pantoprazole    Tablet 40 milliGRAM(s) Oral before breakfast    MEDICATIONS  (PRN):  artificial tears (preservative free) Ophthalmic Solution 1 Drop(s) Both EYES two times a day PRN Dry Eyes      Drug Dosing Weight  Height (cm): 152.4 (05 Sep 2019 14:04)  Weight (kg): 61.4 (04 Sep 2019 11:30)  BMI (kg/m2): 26.4 (05 Sep 2019 14:04)  BSA (m2): 1.58 (05 Sep 2019 14:04)    PAST MEDICAL & SURGICAL HISTORY:  Stroke  CAD (coronary artery disease)  S/P CABG (coronary artery bypass graft)      FAMILY HISTORY:  Family history of MI (myocardial infarction) (Mother)  Family history of CABG (Sibling)    SOCIAL HISTORY: not a current smoker    ICU Vital Signs Last 24 Hrs  T(C): 36.9 (05 Sep 2019 14:00), Max: 36.9 (04 Sep 2019 20:11)  T(F): 98.4 (05 Sep 2019 14:00), Max: 98.5 (05 Sep 2019 01:26)  HR: 80 (05 Sep 2019 12:32) (63 - 80)  BP: 127/66 (05 Sep 2019 12:32) (117/59 - 155/67)  BP(mean): 90 (05 Sep 2019 12:32) (86 - 109)  ABP: --  ABP(mean): --  RR: 18 (05 Sep 2019 12:32) (17 - 18)  SpO2: 94% (05 Sep 2019 12:32) (93% - 97%)          I&O's Detail      PHYSICAL EXAM:    Constitutional: NAD  Eyes: PERRLA  ENMT: MMM  Neck: supple  Back: midline  Respiratory: CTA b/l  Cardiovascular: RRR, s1s2, no m/r/g  Gastrointestinal: soft, NTND, + BS  Extremities: warm  Vascular: + 2 pulses radial  Neurological: AAO x 3, strength 5/5 in all extremities  Skin: no ulcer, no rash  Lymph Nodes: no LAD  Musculoskeletal: no joint swelling  Psychiatric: normal affect    LABS:  CBC Full  -  ( 05 Sep 2019 07:28 )  WBC Count : 6.06 K/uL  RBC Count : 4.33 M/uL  Hemoglobin : 12.9 g/dL  Hematocrit : 41.2 %  Platelet Count - Automated : 255 K/uL  Mean Cell Volume : 95.2 fl  Mean Cell Hemoglobin : 29.8 pg  Mean Cell Hemoglobin Concentration : 31.3 gm/dL    09-05    144  |  107  |  16  ----------------------------<  100<H>  4.1   |  24  |  0.76    Ca    9.6      05 Sep 2019 07:28  Mg     2.1     09-05    TPro  6.7  /  Alb  3.9  /  TBili  0.5  /  DBili  x   /  AST  13  /  ALT  7<L>  /  AlkPhos  97  09-05    CAPILLARY BLOOD GLUCOSE      POCT Blood Glucose.: 86 mg/dL (04 Sep 2019 11:09)    PT/INR - ( 04 Sep 2019 11:16 )   PT: 10.7 sec;   INR: 0.95          PTT - ( 04 Sep 2019 11:16 )  PTT:33.9 sec  Urinalysis Basic - ( 04 Sep 2019 12:06 )    Color: Yellow / Appearance: Clear / SG: <=1.005 / pH: x  Gluc: x / Ketone: NEGATIVE  / Bili: Negative / Urobili: 0.2 E.U./dL   Blood: x / Protein: NEGATIVE mg/dL / Nitrite: NEGATIVE   Leuk Esterase: Trace / RBC: < 5 /HPF / WBC < 5 /HPF   Sq Epi: x / Non Sq Epi: 0-5 /HPF / Bacteria: Present /HPF        EKG:    ECHO, US:   1. Normal left and right ventricular size and systolic function.   2. Grade I left ventricular diastolic dysfunction without elevated   filling pressure.   3. Moderate tricuspid regurgitation.   4. Mild pulmonic regurgitation.   5. No pericardial effusion.  RADIOLOGY:  CTA head/neck: No significant stenosis of the internal carotid arteries per NASCET criteria. Mild stenosis of the bilateral vertebral artery origins which is similar prior exam.    CT perfusion: Negative CT perfusion study.    CT head w/o contrast: 1. No acute intracranial hemorrhage or transcortical infarct. 2. No significant interval change from prior head CT dated 5/11/2019.

## 2019-09-05 NOTE — PROGRESS NOTE ADULT - ATTENDING COMMENTS
Patient seen and examined with [facility] team.  Agree with above.  See H&P for further recommendations.    Addendum:   Accumetrics - Plavix is therapeutic, but not Aspirin so increase Aspirin dose to 162mg daily.  LDL 81, HgbA1C 6.1% (prediabetic)  Blood pressure is variable - can restart her Metoprolol.  DVT prophylaxis  Awaiting MRI Brain

## 2019-09-05 NOTE — OCCUPATIONAL THERAPY INITIAL EVALUATION ADULT - VISUAL ACUITY
Patient wears glasses, worn during eval, c/o L eye "heaviness" but denies diplopia or blurry vision, able to correctly read clock ~10ft away.

## 2019-09-05 NOTE — OCCUPATIONAL THERAPY INITIAL EVALUATION ADULT - GENERAL OBSERVATIONS, REHAB EVAL
Patient right hand dominant. Chart reviewed, Dr. Wan cleared patient for OOB activity and SAMMIE Finley cleared patient for OT evaluation. Patient received supine in bed, NAD, +IV heplock, +tele.

## 2019-09-05 NOTE — OCCUPATIONAL THERAPY INITIAL EVALUATION ADULT - PLANNED THERAPY INTERVENTIONS, OT EVAL
cognitive, visual perceptual/bed mobility training/ADL retraining/balance training/fine motor coordination training/transfer training

## 2019-09-05 NOTE — CONSULT NOTE ADULT - ASSESSMENT
89 year old F with PMHx of CAD, CABG x3, CVA (chronic left occipital lobe), spinal stenosis, sciatica s/p 3 epidural injections for left leg pain who presents with multiple episodes of word finding difficulties since 1 day ago and left facial numbness admitted to stroke telemetry to r/o stroke     Problem/Plan - 1:  ·  Problem: R/O Stroke.  Plan: - Closely follow neuro checks every 2-4 hours   - Repeat HCT for acute changes in neuro exam  - f/u MRI Brain without contrast  - f/u TTE without bubble  - Goal SBP < 200  - Bedside Dysphagia Screen  - PT/OT/SLP.     Problem/Plan - 2:  ·  Problem: CAD (coronary artery disease).  Plan: History of CAD, CABG x3. On home aspirin an dplavix  - c/w aspirin 81  - c/w plavix 75  - Obtain Hemoglobin A1C, Lipid Profile Panel, and TSH  - obtain accumetrix: P2Y12 and ASA assay (special hematology).     Problem/Plan - 3:  ·  Problem: Prophylactic measure.  Plan: DVT: heparin subq, scd  PPI: none needed.     Problem/Plan - 4:  ·  Problem: Nutrition, metabolism, and development symptoms.  Plan: F: no IVF  E: replete as needed  N: DASH/DIET.
89 year old F with PMHx of CAD, CABG x3, CVA (chronic left occipital lobe), spinal stenosis, sciatica s/p 3 epidural injections for left leg pain who presents with multiple episodes of word finding difficulties since 1 day ago and left facial numbness admitted to stroke telemetry to r/o stroke

## 2019-09-05 NOTE — SPEECH LANGUAGE PATHOLOGY EVALUATION - COMMENTS
Pt is a 89 year old F with PMHx of CAD, CABG x3, CVA (chronic left occipital lobe), spinal stenosis, sciatica s/p 3 epidural injections for Left leg pain who presents with multiple episodes of word finding difficulties and left facial numbness. On arrival to [facility] CTH negative. CTA head showed significant stenosis of the internal carotid arteries per NASCET criteria. Mild stenosis of the bilateral vertebral artery origins. CT brain stroke protocol showed no acute intracranial hemorrhage or transcortical infarct. Speech is fluent at the conversation level. Reading fluency was WFL. However, comprehension at the sentence level was impaired. Pt had to re-read questions, and cues were occasionally provided to assist with comprehension. difficult to assess d/t reported arthritis impacting writing skills prior to admission WFL

## 2019-09-05 NOTE — CONSULT NOTE ADULT - SUBJECTIVE AND OBJECTIVE BOX
Patient is a 89y old  Female who presents with a chief complaint of r/o stroke (04 Sep 2019 17:58)       HPI:  89 year old F with PMHx of CAD, CABG x3, CVA (chronic left occipital lobe), spinal stenosis, sciatica s/p 3 epidural injections for Left leg pain who presents with multiple episodes of word finding difficulties and left facial numbness. Patient arrived to [facility] by cab after she had concerns of 2 episodes of word finding difficulty and left facial numbness yesterday at 1pm and 8pm both which resolved after several minutes. This morning she woke up with similar symptoms of having another episode of word finding difficulty and left facial numbness. She reports that she has had similar symptoms in the past but this time she felt generalized weakness. Patient denies headache, dizziness, vision changes, chest pain, abdominal pain, dysuria, hematuria, diarrhea, constipation. 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 She is a former smoker but has not smoked in the last 30 years.    In the ED: T: 98, HR 66-70, -127/57-74, RR 16-18, SpO2 92-99 RA. Labs significant only for elevated triglycerides 174. CTH negative. CTA head showed significant stenosis of the internal carotid arteries per NASCET criteria. Mild stenosis of the bilateral vertebral artery origins. CT brain stroke protocol showed no acute intracranial hemorrhage or transcortical infarct.   Patient being admitted to [facility] to r/o stroke and further monitoring (04 Sep 2019 17:58)      PAST MEDICAL & SURGICAL HISTORY:  Stroke  CAD (coronary artery disease)  S/P CABG (coronary artery bypass graft)      MEDICATIONS  (STANDING):  aspirin enteric coated 81 milliGRAM(s) Oral every 24 hours  atorvastatin 80 milliGRAM(s) Oral at bedtime  clopidogrel Tablet 75 milliGRAM(s) Oral daily  heparin  Injectable 5000 Unit(s) SubCutaneous every 8 hours  influenza   Vaccine 0.5 milliLiter(s) IntraMuscular once  pantoprazole    Tablet 40 milliGRAM(s) Oral before breakfast    MEDICATIONS  (PRN):  artificial tears (preservative free) Ophthalmic Solution 1 Drop(s) Both EYES two times a day PRN Dry Eyes      Social History: ,  is a NH resident           -  (quit 30 yrs ago)  tobacco,  (- )   IVDA,  (- )  iliicit drug use,  (- )  alcohol           - Occupation:  volunteers as a [employer]           - Home Living Status: lives alone in an elevator accessible apartment building           - Home Care Services:  X    Functional Level Prior to Admission:           - ADL's:  independent           - ambulates without assistive devices    FAMILY HISTORY:  Family history of MI (myocardial infarction) (Mother)  Family history of CABG (Sibling)      CBC Full  -  ( 05 Sep 2019 07:28 )  WBC Count : 6.06 K/uL  RBC Count : 4.33 M/uL  Hemoglobin : 12.9 g/dL  Hematocrit : 41.2 %  Platelet Count - Automated : 255 K/uL  Mean Cell Volume : 95.2 fl  Mean Cell Hemoglobin : 29.8 pg  Mean Cell Hemoglobin Concentration : 31.3 gm/dL  Auto Neutrophil # : x  Auto Lymphocyte # : x  Auto Monocyte # : x  Auto Eosinophil # : x  Auto Basophil # : x  Auto Neutrophil % : x  Auto Lymphocyte % : x  Auto Monocyte % : x  Auto Eosinophil % : x  Auto Basophil % : x      09-05    144  |  107  |  16  ----------------------------<  100<H>  4.1   |  24  |  0.76    Ca    9.6      05 Sep 2019 07:28  Mg     2.1     09-05    TPro  6.7  /  Alb  3.9  /  TBili  0.5  /  DBili  x   /  AST  13  /  ALT  7<L>  /  AlkPhos  97  09-05      Urinalysis Basic - ( 04 Sep 2019 12:06 )    Color: Yellow / Appearance: Clear / SG: <=1.005 / pH: x  Gluc: x / Ketone: NEGATIVE  / Bili: Negative / Urobili: 0.2 E.U./dL   Blood: x / Protein: NEGATIVE mg/dL / Nitrite: NEGATIVE   Leuk Esterase: Trace / RBC: < 5 /HPF / WBC < 5 /HPF   Sq Epi: x / Non Sq Epi: 0-5 /HPF / Bacteria: Present /HPF          Radiology:    < from: CT Brain Stroke Protocol (09.04.19 @ 11:28) >  EXAM:  CT BRAIN STROKE PROTOCOL                          PROCEDURE DATE:  09/04/2019          INTERPRETATION:  INDICATIONS: Left facial droop, word finding   difficulties.    TECHNIQUE: Serial axial images were obtained from the skull base to the   vertex without the use of intravenous contrast. Sagittal and coronal   images were reformatted.    COMPARISON EXAMINATION: CT head from 5/11/2019    FINDINGS:    VENTRICLES AND SULCI: Mild ventricular and sulcal prominence due to   age-related parenchymal volume loss. No hydrocephalus.  INTRA-AXIAL: No intracranial mass, acute hemorrhage, midline shift or   acute transcortical infarct is seen. There are areas of hypodensity in   the periventricular white matter consistent with microangiopathic   disease. Again seen is a small area of encephalomalacia in the left   occipital lobe consistent with prior infarct.  EXTRA-AXIAL: No extra-axial fluid collection is present.   VISUALIZED SINUSES: No air-fluid levels are identified.   VISUALIZED MASTOIDS: Clear.  CALVARIUM: Normal.  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    IMPRESSION:   1. No acute intracranial hemorrhage or transcortical infarct.    2. No significant interval change from prior head CT dated 5/11/2019.        < from: CT Perfusion w/ Maps w/ IV Cont (09.04.19 @ 11:29) >  EXAM:  CT PERFUSION W MAPS IC                          PROCEDURE DATE:  09/04/2019          INTERPRETATION:  CT perfusion    INDICATION: Left facial droop.    TECHNIQUE: After the bolus administration of 40 mL of Optiray-350, CT   perfusion is obtained as per iSchemaView RAPID protocol. Perfusion maps   are provided.    COMPARISON: CT head from 5/11/2019.    FINDINGS:  On CT perfusion, there is no reported defect in CBF <30% or region of   elevated Tmax > 6 seconds, nor mismatch in volume thereof.      IMPRESSION:    Negative CT perfusion study.        < from: CT Angio Head w/ IV Cont (09.04.19 @ 11:30) >  EXAM:  CT ANGIO NECK (W)AW IC                          EXAM:  CT ANGIO BRAIN (W)AW IC                          PROCEDURE DATE:  09/04/2019          INTERPRETATION:  PROCEDURE: CTA brain with intravenous contrast.    INDICATION: Left facial droop.     TECHNIQUE: Multiple axial thin section were obtained through the [place]   of [place] following the intravenous bolus injection of 90 Optiray-350.   MIP series are provided.    COMPARISON: None.     FINDINGS: The internal carotid arteries are patent at the skull base and   intracranial compartment without occlusion or high grade stenosis. The   anterior and middle cerebral arteries are patent at their 1st and 2nd   order segments, and appear symmetric caliber. The posterior circulation   shows nohigh grade stenosis or occlusion. The intracranial vertebral   arteries, the basilar artery and both posterior cerebral arteries are   patent. There is no site of aneurysm within the resolution limitations of   CTA. The bilateral posterior communicating arteries are present. The   anterior communicating artery is present.    IMPRESSION: No large vessel occlusion.      PROCEDURE: CTA neck with intravenous contrast.    INDICATION: Left facial droop.     TECHNIQUE: Multiple axial thin section were obtained through the neck   following the intravenous bolus injection of Optiray-350 as above. MIP   series are provided.    COMPARISON: none.     FINDINGS:     The aortic arch is normal size and shows patency, with normal 3 vessel   configuration.    There is mild atherosclerosis of the aortic arch and the origins of great   vessels without hemodynamically significant stenosis.    There is mild stenosis of the proximal left vertebral artery. There is   moderate stenosis of the proximal right vertebral artery. The remainder   of the basilar arteries are patent without significant stenosis. There is   tortuosity of the proximal left common carotid artery. There is contrast   filling the bilateral common carotid arteries without hemodynamically   significant stenosis. There is a retropharyngeal course of the left   internal carotid artery. There is mild atherosclerosis of the left   carotid bifurcation without significant stenosis per NASCET criteria.    There is contrast filling of the rightinternal carotid artery without   significant stenosis per NASCET criteria.    There is mild anterolisthesis C3 on C4.    IMPRESSION: No significant stenosis of the internal carotid arteries per   NASCET criteria. Mild stenosis of the bilateral vertebral artery origins   which is similar prior exam.          Vital Signs Last 24 Hrs  T(C): 36.9 (05 Sep 2019 05:27), Max: 36.9 (04 Sep 2019 20:11)  T(F): 98.4 (05 Sep 2019 05:27), Max: 98.5 (05 Sep 2019 01:26)  HR: 64 (05 Sep 2019 08:33) (63 - 72)  BP: 150/70 (05 Sep 2019 08:33) (114/57 - 155/67)  BP(mean): 100 (05 Sep 2019 08:33) (86 - 109)  RR: 17 (05 Sep 2019 08:33) (16 - 18)  SpO2: 96% (05 Sep 2019 08:33) (93% - 99%)    REVIEW OF SYSTEMS:    CONSTITUTIONAL: No fever, weight loss, or fatigue  EYES: No eye pain, visual disturbances, or discharge  ENMT:  No difficulty hearing, tinnitus, vertigo; No sinus or throat pain  NECK: No pain or stiffness  BREASTS: No pain, masses, or nipple discharge  RESPIRATORY: No cough, wheezing, chills or hemoptysis; No shortness of breath  CARDIOVASCULAR: No chest pain, palpitations, dizziness, or leg swelling  GASTROINTESTINAL: No abdominal or epigastric pain. No nausea, vomiting, or hematemesis; No diarrhea or constipation. No melena or hematochezia.  GENITOURINARY: No dysuria, frequency, hematuria, or incontinence  NEUROLOGICAL: No headaches, memory loss, loss of strength, numbness, or tremors  SKIN: No itching, burning, rashes, or lesions   LYMPH NODES: No enlarged glands  ENDOCRINE: No heat or cold intolerance; No hair loss  MUSCULOSKELETAL: No joint pain or swelling; No muscle, back, or extremity pain  PSYCHIATRIC: No depression, anxiety, mood swings, or difficulty sleeping  HEME/LYMPH: No easy bruising, or bleeding gums  ALLERGY AND IMMUNOLOGIC: No hives or eczema  VASCULAR: no swelling, erythema      Physical Exam: WDWN 89 yo  woman lying in semi Rivera's position, w/o complaints    Head: normocephalic, atraumatic    Eyes: PERRLA, EOMI, no nystagmus, sclera anicteric    ENT: nasal discharge, uvula midline, no oropharyngeal erythema/exudate    Neck: supple, negative JVD, negative carotid bruits, no thyromegaly    Chest: CTA bilaterally, neg wheeze/ rhonchi/ rales/ crackles/ egophany    Cardiovascular: regular rate and rhythm, neg murmurs/rubs/gallops    Abdomen: soft, non distended, non tender, negative rebound/guarding, normal bowel sounds, neg hepatosplenomegaly    Extremities: WWP, neg cyanosis/clubbing/edema, negative calf tenderness to palpation, negative Sarah's sign    :     Neurologic Exam:    Alert and oriented to person, place, date/year, speech fluent w/o dysarthria, recent and remote memory intact, repetition intact, comprehension intact    Cranial Nerves:     II:                       pupils equal, round and reactive to light, visual fields intact   III/ IV/VI:             extraocular movements intact, neg nystagmus, neg ptosis  V:                       facial sensation intact, V1-3 normal  VII:                     face symmetric, no droop, normal eye closure and smile  VIII:                    hearing intact to finger rub bilaterally  IX/ X:                 soft palate rise symmetrical  XI:                      head turning, shoulder shrug normal  XII:                     tongue midline    Motor Exam:    Upper Extremities:     RIght:    no focal weakness               negative drift    Left :    no focal weakness               negative drift    Lower Extremities:                 Right:     no focal weakness                 Left:       no focal weakness               Sensory:    intact to LT/PP in all UE/LE dermatomes    DTR:            = biceps/     triceps/     brachioradialis                      = patella/   medial hamstring/ankle                      neg clonus                      neg Babinski                        Finger to Nose:  L>R dysmetria    Heel to Shin:  wnl    Rapid Alternating movements:  wnl    Joint Position Sense:  intact    Romberg:  not tested    Tandem Walking:  not tested    Gait:  not tested        PM&R Impression:    1) TIA  2) no focal weakness        Recommendations:    1) Physical therapy focusing on therapeutic exercises, bed mobility/transfer out of bed evaluation, progressive ambulation with assistive devices prn.    2) Anticipated Disposition Plan/Recs: d/c home

## 2019-09-06 ENCOUNTER — TRANSCRIPTION ENCOUNTER (OUTPATIENT)
Age: 84
End: 2019-09-06

## 2019-09-06 LAB
ALBUMIN SERPL ELPH-MCNC: 4.3 G/DL — SIGNIFICANT CHANGE UP (ref 3.3–5)
ALP SERPL-CCNC: 113 U/L — SIGNIFICANT CHANGE UP (ref 40–120)
ALT FLD-CCNC: 11 U/L — SIGNIFICANT CHANGE UP (ref 10–45)
ANION GAP SERPL CALC-SCNC: 13 MMOL/L — SIGNIFICANT CHANGE UP (ref 5–17)
APPEARANCE UR: CLEAR — SIGNIFICANT CHANGE UP
APTT BLD: 45.6 SEC — HIGH (ref 27.5–36.3)
AST SERPL-CCNC: 20 U/L — SIGNIFICANT CHANGE UP (ref 10–40)
BACTERIA # UR AUTO: PRESENT /HPF
BASOPHILS # BLD AUTO: 0.07 K/UL — SIGNIFICANT CHANGE UP (ref 0–0.2)
BASOPHILS NFR BLD AUTO: 0.8 % — SIGNIFICANT CHANGE UP (ref 0–2)
BILIRUB DIRECT SERPL-MCNC: <0.2 MG/DL — SIGNIFICANT CHANGE UP (ref 0–0.2)
BILIRUB INDIRECT FLD-MCNC: >0.2 MG/DL — SIGNIFICANT CHANGE UP (ref 0.2–1)
BILIRUB SERPL-MCNC: 0.4 MG/DL — SIGNIFICANT CHANGE UP (ref 0.2–1.2)
BILIRUB UR-MCNC: NEGATIVE — SIGNIFICANT CHANGE UP
BUN SERPL-MCNC: 11 MG/DL — SIGNIFICANT CHANGE UP (ref 7–23)
CALCIUM SERPL-MCNC: 9.9 MG/DL — SIGNIFICANT CHANGE UP (ref 8.4–10.5)
CHLORIDE SERPL-SCNC: 108 MMOL/L — SIGNIFICANT CHANGE UP (ref 96–108)
CO2 SERPL-SCNC: 22 MMOL/L — SIGNIFICANT CHANGE UP (ref 22–31)
COLOR SPEC: YELLOW — SIGNIFICANT CHANGE UP
COMMENT - URINE: SIGNIFICANT CHANGE UP
COMMENT - URINE: SIGNIFICANT CHANGE UP
CREAT SERPL-MCNC: 0.71 MG/DL — SIGNIFICANT CHANGE UP (ref 0.5–1.3)
DIFF PNL FLD: ABNORMAL
EOSINOPHIL # BLD AUTO: 0.19 K/UL — SIGNIFICANT CHANGE UP (ref 0–0.5)
EOSINOPHIL NFR BLD AUTO: 2.1 % — SIGNIFICANT CHANGE UP (ref 0–6)
EPI CELLS # UR: SIGNIFICANT CHANGE UP /HPF (ref 0–5)
GLUCOSE SERPL-MCNC: 129 MG/DL — HIGH (ref 70–99)
GLUCOSE UR QL: NEGATIVE — SIGNIFICANT CHANGE UP
HCT VFR BLD CALC: 43.2 % — SIGNIFICANT CHANGE UP (ref 34.5–45)
HGB BLD-MCNC: 13.6 G/DL — SIGNIFICANT CHANGE UP (ref 11.5–15.5)
IMM GRANULOCYTES NFR BLD AUTO: 0.4 % — SIGNIFICANT CHANGE UP (ref 0–1.5)
INR BLD: 0.97 — SIGNIFICANT CHANGE UP (ref 0.88–1.16)
KETONES UR-MCNC: NEGATIVE — SIGNIFICANT CHANGE UP
LACTATE SERPL-SCNC: 1.2 MMOL/L — SIGNIFICANT CHANGE UP (ref 0.5–2)
LACTATE SERPL-SCNC: 3.2 MMOL/L — HIGH (ref 0.5–2)
LEUKOCYTE ESTERASE UR-ACNC: ABNORMAL
LYMPHOCYTES # BLD AUTO: 2.19 K/UL — SIGNIFICANT CHANGE UP (ref 1–3.3)
LYMPHOCYTES # BLD AUTO: 24.3 % — SIGNIFICANT CHANGE UP (ref 13–44)
MCHC RBC-ENTMCNC: 29.8 PG — SIGNIFICANT CHANGE UP (ref 27–34)
MCHC RBC-ENTMCNC: 31.5 GM/DL — LOW (ref 32–36)
MCV RBC AUTO: 94.5 FL — SIGNIFICANT CHANGE UP (ref 80–100)
MONOCYTES # BLD AUTO: 0.8 K/UL — SIGNIFICANT CHANGE UP (ref 0–0.9)
MONOCYTES NFR BLD AUTO: 8.9 % — SIGNIFICANT CHANGE UP (ref 2–14)
NEUTROPHILS # BLD AUTO: 5.71 K/UL — SIGNIFICANT CHANGE UP (ref 1.8–7.4)
NEUTROPHILS NFR BLD AUTO: 63.5 % — SIGNIFICANT CHANGE UP (ref 43–77)
NITRITE UR-MCNC: NEGATIVE — SIGNIFICANT CHANGE UP
NRBC # BLD: 0 /100 WBCS — SIGNIFICANT CHANGE UP (ref 0–0)
PH UR: 6 — SIGNIFICANT CHANGE UP (ref 5–8)
PLATELET # BLD AUTO: 312 K/UL — SIGNIFICANT CHANGE UP (ref 150–400)
POTASSIUM SERPL-MCNC: 4.4 MMOL/L — SIGNIFICANT CHANGE UP (ref 3.5–5.3)
POTASSIUM SERPL-SCNC: 4.4 MMOL/L — SIGNIFICANT CHANGE UP (ref 3.5–5.3)
PROT SERPL-MCNC: 7.6 G/DL — SIGNIFICANT CHANGE UP (ref 6–8.3)
PROT UR-MCNC: NEGATIVE MG/DL — SIGNIFICANT CHANGE UP
PROTHROM AB SERPL-ACNC: 11 SEC — SIGNIFICANT CHANGE UP (ref 10–12.9)
RBC # BLD: 4.57 M/UL — SIGNIFICANT CHANGE UP (ref 3.8–5.2)
RBC # FLD: 13.8 % — SIGNIFICANT CHANGE UP (ref 10.3–14.5)
RBC CASTS # UR COMP ASSIST: < 5 /HPF — SIGNIFICANT CHANGE UP
SODIUM SERPL-SCNC: 143 MMOL/L — SIGNIFICANT CHANGE UP (ref 135–145)
SP GR SPEC: 1.01 — SIGNIFICANT CHANGE UP (ref 1–1.03)
UROBILINOGEN FLD QL: 0.2 E.U./DL — SIGNIFICANT CHANGE UP
WBC # BLD: 9 K/UL — SIGNIFICANT CHANGE UP (ref 3.8–10.5)
WBC # FLD AUTO: 9 K/UL — SIGNIFICANT CHANGE UP (ref 3.8–10.5)
WBC UR QL: < 5 /HPF — SIGNIFICANT CHANGE UP

## 2019-09-06 PROCEDURE — 99233 SBSQ HOSP IP/OBS HIGH 50: CPT

## 2019-09-06 PROCEDURE — 70551 MRI BRAIN STEM W/O DYE: CPT | Mod: 26

## 2019-09-06 PROCEDURE — 71045 X-RAY EXAM CHEST 1 VIEW: CPT | Mod: 26

## 2019-09-06 PROCEDURE — 99233 SBSQ HOSP IP/OBS HIGH 50: CPT | Mod: GC

## 2019-09-06 RX ORDER — APIXABAN 2.5 MG/1
5 TABLET, FILM COATED ORAL EVERY 12 HOURS
Refills: 0 | Status: DISCONTINUED | OUTPATIENT
Start: 2019-09-06 | End: 2019-09-08

## 2019-09-06 RX ORDER — METOPROLOL TARTRATE 50 MG
2.5 TABLET ORAL ONCE
Refills: 0 | Status: COMPLETED | OUTPATIENT
Start: 2019-09-06 | End: 2019-09-06

## 2019-09-06 RX ORDER — DILTIAZEM HCL 120 MG
5 CAPSULE, EXT RELEASE 24 HR ORAL ONCE
Refills: 0 | Status: COMPLETED | OUTPATIENT
Start: 2019-09-06 | End: 2019-09-06

## 2019-09-06 RX ORDER — METOPROLOL TARTRATE 50 MG
25 TABLET ORAL EVERY 12 HOURS
Refills: 0 | Status: DISCONTINUED | OUTPATIENT
Start: 2019-09-06 | End: 2019-09-08

## 2019-09-06 RX ORDER — SODIUM CHLORIDE 9 MG/ML
500 INJECTION INTRAMUSCULAR; INTRAVENOUS; SUBCUTANEOUS ONCE
Refills: 0 | Status: COMPLETED | OUTPATIENT
Start: 2019-09-06 | End: 2019-09-06

## 2019-09-06 RX ORDER — ACETAMINOPHEN 500 MG
650 TABLET ORAL ONCE
Refills: 0 | Status: COMPLETED | OUTPATIENT
Start: 2019-09-06 | End: 2019-09-06

## 2019-09-06 RX ORDER — METOPROLOL TARTRATE 50 MG
5 TABLET ORAL ONCE
Refills: 0 | Status: COMPLETED | OUTPATIENT
Start: 2019-09-06 | End: 2019-09-06

## 2019-09-06 RX ADMIN — APIXABAN 5 MILLIGRAM(S): 2.5 TABLET, FILM COATED ORAL at 21:22

## 2019-09-06 RX ADMIN — Medication 5 MILLIGRAM(S): at 17:25

## 2019-09-06 RX ADMIN — ATORVASTATIN CALCIUM 80 MILLIGRAM(S): 80 TABLET, FILM COATED ORAL at 21:21

## 2019-09-06 RX ADMIN — Medication 25 MILLIGRAM(S): at 19:33

## 2019-09-06 RX ADMIN — Medication 650 MILLIGRAM(S): at 17:34

## 2019-09-06 RX ADMIN — Medication 0.25 MILLIGRAM(S): at 23:31

## 2019-09-06 RX ADMIN — Medication 2.5 MILLIGRAM(S): at 17:33

## 2019-09-06 RX ADMIN — CLOPIDOGREL BISULFATE 75 MILLIGRAM(S): 75 TABLET, FILM COATED ORAL at 12:18

## 2019-09-06 RX ADMIN — HEPARIN SODIUM 5000 UNIT(S): 5000 INJECTION INTRAVENOUS; SUBCUTANEOUS at 06:42

## 2019-09-06 RX ADMIN — Medication 5 MILLIGRAM(S): at 17:55

## 2019-09-06 RX ADMIN — HEPARIN SODIUM 5000 UNIT(S): 5000 INJECTION INTRAVENOUS; SUBCUTANEOUS at 14:37

## 2019-09-06 RX ADMIN — SODIUM CHLORIDE 500 MILLILITER(S): 9 INJECTION INTRAMUSCULAR; INTRAVENOUS; SUBCUTANEOUS at 17:47

## 2019-09-06 RX ADMIN — PANTOPRAZOLE SODIUM 40 MILLIGRAM(S): 20 TABLET, DELAYED RELEASE ORAL at 06:42

## 2019-09-06 NOTE — DISCHARGE NOTE NURSING/CASE MANAGEMENT/SOCIAL WORK - PATIENT PORTAL LINK FT
You can access the FollowMyHealth Patient Portal offered by [facility] by registering at the following website: [email or web address]. By joining [email or web address]’s FollowMyHealth portal, you will also be able to view your health information using other applications (apps) compatible with our system.

## 2019-09-06 NOTE — PHYSICAL THERAPY INITIAL EVALUATION ADULT - CRITERIA FOR SKILLED THERAPEUTIC INTERVENTIONS
impairments found/rehab potential/therapy frequency/anticipated equipment needs at discharge/functional limitations in following categories/anticipated discharge recommendation

## 2019-09-06 NOTE — PROGRESS NOTE ADULT - SUBJECTIVE AND OBJECTIVE BOX
HPI:  Brief Hospital Course:  Pt is a 89 year old F with PMHx of CAD, CABG x3, CVA (chronic left occipital lobe), spinal stenosis, sciatica s/p 3 epidural injections for Left leg pain who presents with multiple episodes of word finding difficulties and left facial numbness. On arrival to [facility] CTH negative. CTA head showed significant stenosis of the internal carotid arteries per NASCET criteria. Mild stenosis of the bilateral vertebral artery origins. CT brain stroke protocol showed no acute intracranial hemorrhage or transcortical infarct.     Subjective:  Today pt feels that her symptoms have improved, although still reports occasional word finding difficulty. 12 pt ROS is negative.    Allergies    codeine (Nausea)    Intolerances    Home medications: Plavix, Atorvastatin, Protonix, Metoprolol tartrate, ASA    MEDICATIONS  (STANDING):  aspirin enteric coated 162 milliGRAM(s) Oral daily  atorvastatin 80 milliGRAM(s) Oral at bedtime  clopidogrel Tablet 75 milliGRAM(s) Oral daily  heparin  Injectable 5000 Unit(s) SubCutaneous every 8 hours  influenza   Vaccine 0.5 milliLiter(s) IntraMuscular once  pantoprazole    Tablet 40 milliGRAM(s) Oral before breakfast    MEDICATIONS  (PRN):  artificial tears (preservative free) Ophthalmic Solution 1 Drop(s) Both EYES two times a day PRN Dry Eyes  sodium chloride 0.65% Nasal 1 Spray(s) Both Nostrils four times a day PRN Nasal Congestion        Drug Dosing Weight  Height (cm): 152.4 (05 Sep 2019 14:04)  Weight (kg): 61.4 (04 Sep 2019 11:30)  BMI (kg/m2): 26.4 (05 Sep 2019 14:04)  BSA (m2): 1.58 (05 Sep 2019 14:04)    PAST MEDICAL & SURGICAL HISTORY:  Stroke  CAD (coronary artery disease)  S/P CABG (coronary artery bypass graft)      FAMILY HISTORY:  Family history of MI (myocardial infarction) (Mother)  Family history of CABG (Sibling)    SOCIAL HISTORY: not a current smoker    ICU Vital Signs Last 24 Hrs  T(C): 36.6 (06 Sep 2019 10:03), Max: 37.2 (05 Sep 2019 17:27)  T(F): 97.8 (06 Sep 2019 10:03), Max: 99 (05 Sep 2019 17:27)  HR: 76 (06 Sep 2019 11:27) (76 - 84)  BP: 160/70 (06 Sep 2019 11:27) (135/66 - 160/70)  BP(mean): 101 (06 Sep 2019 11:27) (92 - 105)  ABP: --  ABP(mean): --  RR: 19 (06 Sep 2019 08:50) (17 - 19)  SpO2: 96% (06 Sep 2019 11:27) (94% - 96%)    I&O's Detail      PHYSICAL EXAM:    Constitutional: NAD  Eyes: PERRLA  ENMT: MMM  Neck: supple  Back: midline  Respiratory: CTA b/l  Cardiovascular: RRR, s1s2, no m/r/g  Gastrointestinal: soft, NTND, + BS  Extremities: warm  Vascular: + 2 pulses radial  Neurological: AAO x 3, strength 5/5 in all extremities  Skin: no ulcer, no rash  Lymph Nodes: no LAD  Musculoskeletal: no joint swelling  Psychiatric: normal affect    LABS:                        12.9   6.06  )-----------( 255      ( 05 Sep 2019 07:28 )             41.2   09-05    144  |  107  |  16  ----------------------------<  100<H>  4.1   |  24  |  0.76    Ca    9.6      05 Sep 2019 07:28  Mg     2.1     09-05    TPro  6.7  /  Alb  3.9  /  TBili  0.5  /  DBili  x   /  AST  13  /  ALT  7<L>  /  AlkPhos  97  09-05      CAPILLARY BLOOD GLUCOSE      POCT Blood Glucose.: 86 mg/dL (04 Sep 2019 11:09)    PT/INR - ( 04 Sep 2019 11:16 )   PT: 10.7 sec;   INR: 0.95          PTT - ( 04 Sep 2019 11:16 )  PTT:33.9 sec  Urinalysis Basic - ( 04 Sep 2019 12:06 )    Color: Yellow / Appearance: Clear / SG: <=1.005 / pH: x  Gluc: x / Ketone: NEGATIVE  / Bili: Negative / Urobili: 0.2 E.U./dL   Blood: x / Protein: NEGATIVE mg/dL / Nitrite: NEGATIVE   Leuk Esterase: Trace / RBC: < 5 /HPF / WBC < 5 /HPF   Sq Epi: x / Non Sq Epi: 0-5 /HPF / Bacteria: Present /HPF        EKG:    ECHO, US:   1. Normal left and right ventricular size and systolic function.   2. Grade I left ventricular diastolic dysfunction without elevated   filling pressure.   3. Moderate tricuspid regurgitation.   4. Mild pulmonic regurgitation.   5. No pericardial effusion.  RADIOLOGY:  CTA head/neck: No significant stenosis of the internal carotid arteries per NASCET criteria. Mild stenosis of the bilateral vertebral artery origins which is similar prior exam.    CT perfusion: Negative CT perfusion study.    CT head w/o contrast: 1. No acute intracranial hemorrhage or transcortical infarct. 2. No significant interval change from prior head CT dated 5/11/2019.

## 2019-09-06 NOTE — CHART NOTE - NSCHARTNOTEFT_GEN_A_CORE
Pt noted to be in Afib RVR with HR in 140s. Pt currently HDS and asymptomatic. Rectal temp 100 at this time. Labs completed with normal WBC count, normal electrolytes. Given low grade fever, less likely infectious etiology of afib. Pt given 500cc bolus and tylenol. metoprolol 5mg IVP, followed by 2.5 mg IVP with no improvement of HR. Critical care attending at bedside. Pt given cardizem 5mg IVP. At this time, IV site noted to be infiltrated; pt was likely not receiving medications. Additional IV placed. At this time pt converts into Aflutter with HR 80-110s. CXR unchanged, UA pending. Attending physician contacted and recommend switching to eliquis 5mg BID given new onset Afib. Home metoprolol 25mg BID restarted. Medicine attending also informed and recommend to continue current management. HR now well controlled, consider cardio consult if HR >110s.   Night team aware, will continue to monitor.

## 2019-09-06 NOTE — PHYSICAL THERAPY INITIAL EVALUATION ADULT - GENERAL OBSERVATIONS, REHAB EVAL
Pt received supine in bed with +hep-lock, +EKG, +bed alarm, NAD. Pt left as found, NAD, call bell in reach, +bed alarm, RN awares.

## 2019-09-06 NOTE — PHYSICAL THERAPY INITIAL EVALUATION ADULT - ADDITIONAL COMMENTS
Pt lives alone in an apartment +elevator, 2 steps get into building. Prior to admission, pt ambulated independently with straight cane, pt also has standard walker, quad cane at home. Pt admits 6 falls this year.

## 2019-09-06 NOTE — PROGRESS NOTE ADULT - SUBJECTIVE AND OBJECTIVE BOX
OVERNIGHT EVENTS: GAUDENCIO    SUBJECTIVE / INTERVAL HPI: Patient seen and examined at bedside. Denies f/c, n/v, HA, chest pain, SOB, abdominal pain, diarrhea, constipation, melena, hematochezia, hematuria, dysuria,    VITAL SIGNS:  Vital Signs Last 24 Hrs  T(C): 36.7 (06 Sep 2019 17:00), Max: 36.9 (06 Sep 2019 14:00)  T(F): 98 (06 Sep 2019 17:00), Max: 98.5 (06 Sep 2019 14:00)  HR: 84 (06 Sep 2019 16:15) (76 - 92)  BP: 127/79 (06 Sep 2019 16:15) (127/79 - 160/70)  BP(mean): 99 (06 Sep 2019 16:15) (92 - 108)  RR: 18 (06 Sep 2019 16:15) (17 - 19)  SpO2: 94% (06 Sep 2019 16:15) (93% - 97%)        Physical exam:  General: No acute distress, awake and alert  Cardiovascular: Regular rate and rhythm, no murmurs  Pulmonary: Anterior breath sounds clear bilaterally, no crackles or wheezing. No use of accessory muscles  Extremities: Radial and DP pulses +2, no edema    Neurologic:  -Mental status: Awake, alert, oriented to person, place, and time. Speech is fluent with intact naming, repetition, and comprehension, no dysarthria. Recent and remote memory intact. Follows commands. Attention/concentration intact. Fund of knowledge appropriate.  -Cranial nerves:   II: Visual fields are full to confrontation.  III, IV, VI: Extraocular movements are intact without nystagmus. Pupils equally round and reactive to light  V:  Facial sensation V1-V3 equal and intact   VII: Face is symmetric with normal eye closure and smile  VIII: Hearing is bilaterally intact to finger rub  IX, X: Uvula is midline and soft palate rises symmetrically  XI: Head turning and shoulder shrug are intact.  XII: Tongue protrudes midline  Motor: Normal bulk and tone. No pronator drift. Strength bilateral upper extremity 5/5, bilateral lower extremities 5/5.  Rapid alternating movements intact and symmetric  Sensation: Intact to light touch bilaterally  Coordination: No dysmetria on finger-to-nose  Gait: Narrow gait and steady      MEDICATIONS  (STANDING):  apixaban 5 milliGRAM(s) Oral every 12 hours  atorvastatin 80 milliGRAM(s) Oral at bedtime  influenza   Vaccine 0.5 milliLiter(s) IntraMuscular once  metoprolol tartrate 25 milliGRAM(s) Oral every 12 hours  pantoprazole    Tablet 40 milliGRAM(s) Oral before breakfast    MEDICATIONS  (PRN):  artificial tears (preservative free) Ophthalmic Solution 1 Drop(s) Both EYES two times a day PRN Dry Eyes  sodium chloride 0.65% Nasal 1 Spray(s) Both Nostrils four times a day PRN Nasal Congestion    Allergies    codeine (Nausea)    Intolerances        LABS:                        13.6   9.00  )-----------( 312      ( 06 Sep 2019 18:09 )             43.2     09-06    143  |  108  |  11  ----------------------------<  129<H>  4.4   |  22  |  0.71    Ca    9.9      06 Sep 2019 18:09  Mg     2.1     09-05    TPro  7.6  /  Alb  4.3  /  TBili  0.4  /  DBili  <0.2  /  AST  20  /  ALT  11  /  AlkPhos  113  09-06    PT/INR - ( 06 Sep 2019 18:11 )   PT: 11.0 sec;   INR: 0.97          PTT - ( 06 Sep 2019 18:11 )  PTT:45.6 sec    CAPILLARY BLOOD GLUCOSE              RADIOLOGY & ADDITIONAL TESTS: Reviewed.

## 2019-09-06 NOTE — PROGRESS NOTE ADULT - ASSESSMENT
89 year old F with PMHx of CAD, CABG x3, CVA (chronic left occipital lobe), spinal stenosis, sciatica s/p 3 epidural injections for left leg pain who presents with multiple episodes of word finding difficulties since 1 day ago and left facial numbness admitted to stroke telemetry to r/o stroke, pending MRI

## 2019-09-06 NOTE — PHYSICAL THERAPY INITIAL EVALUATION ADULT - PERTINENT HX OF CURRENT PROBLEM, REHAB EVAL
Pt is a 89 yo female  who presents with multiple episodes of word finding difficulties since 1 day ago and left facial numbness admitted to stroke telemetry to r/o stroke

## 2019-09-06 NOTE — DISCHARGE NOTE NURSING/CASE MANAGEMENT/SOCIAL WORK - NSDCPEEMAIL_GEN_ALL_CORE
[facility] email [email or web address]

## 2019-09-06 NOTE — DISCHARGE NOTE NURSING/CASE MANAGEMENT/SOCIAL WORK - NSDCPEWEB_GEN_ALL_CORE
[facility] website --- [email or web address] --- [email or web address]

## 2019-09-06 NOTE — PHYSICAL THERAPY INITIAL EVALUATION ADULT - LEVEL OF INDEPENDENCE: GAIT, REHAB EVAL
100 feet with 1 HHA mimic as cane with CG--> close supervision, pt then ambulated~ 100 feet with rolling walker with supervision

## 2019-09-06 NOTE — PROGRESS NOTE ADULT - ASSESSMENT
89 year old F with PMHx of CAD, CABG x3, CVA (chronic left occipital lobe), spinal stenosis, sciatica s/p 3 epidural injections for left leg pain who presents with multiple episodes of word finding difficulties since 1 day ago and left facial numbness admitted to stroke telemetry to r/o stroke     Problem/Plan - 1:  ·  Problem: R/O Stroke.  Plan: Closely follow neuro checks every 2-4 hours. CTH negative. JENNIFER w/ bubbles showed moderate tricuspid regurgitation and mild pulmonic regurgitation.  - Repeat HCT for acute changes in neuro exam  - f/u MRI Brain without contrast  - Goal SBP < 200  - PT/OT/SLP.     Problem/Plan - 2:  ·  Problem: CAD (coronary artery disease).  Plan: History of CAD, CABG x3, pre-DM (hga1c 6.1 on 9/4). On home aspirin and plavix. Accumetrics p2y12 was 145, suggesting that home plavix medication has been effective. Platelet response aspirin result was 546. TSH: 4.2, triglycerides 152, cholesteral 166.  - c/w aspirin 81  - c/w plavix 75.     Problem/Plan - 3:  ·  Problem: Nutrition, metabolism, and development symptoms.  Plan: F: no IVF  E: replete as needed  N: DASH/DIET.     Problem/Plan - 4:  ·  Problem: Prophylactic measure.  Plan: DVT: heparin subq, scd  PPI: none needed.     Attending Attestation:   Patient seen and examined with 7 [facility] team.  Agree with above.  See H&P for further recommendations.    Addendum:   Accumetrics - Plavix is therapeutic, but not Aspirin so increase Aspirin dose to 162mg daily.  LDL 81, HgbA1C 6.1% (prediabetic)  Blood pressure is variable - can restart her Metoprolol.  DVT prophylaxis

## 2019-09-06 NOTE — PHYSICAL THERAPY INITIAL EVALUATION ADULT - GAIT DEVIATIONS NOTED, PT EVAL
decreased step length/fairly steady gait, slightly increased lateral sway, no lose of balance, decreased heel strike and hip flexion bilaterally., requires verbal cues from PT to maintain upright posture./decreased poonam/increased time in double stance

## 2019-09-06 NOTE — PROGRESS NOTE ADULT - SUBJECTIVE AND OBJECTIVE BOX
Physical Medicine and Rehabilitation Progress Note:    Patient is a 89y old  Female who presents with a chief complaint of r/o stroke (06 Sep 2019 12:45)      HPI:  89 year old F with PMHx of CAD, CABG x3, CVA (chronic left occipital lobe), spinal stenosis, sciatica s/p 3 epidural injections for Left leg pain who presents with multiple episodes of word finding difficulties and left facial numbness. Patient arrived to [facility] by cab after she had concerns of 2 episodes of word finding difficulty and left facial numbness yesterday at 1pm and 8pm both which resolved after several minutes. This morning she woke up with similar symptoms of having another episode of word finding difficulty and left facial numbness. She reports that she has had similar symptoms in the past but this time she felt generalized weakness. Patient denies headache, dizziness, vision changes, chest pain, abdominal pain, dysuria, hematuria, diarrhea, constipation. 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 She is a former smoker but has not smoked in the last 30 years.    In the ED: T: 98, HR 66-70, -127/57-74, RR 16-18, SpO2 92-99 RA. Labs significant only for elevated triglycerides 174. CTH negative. CTA head showed significant stenosis of the internal carotid arteries per NASCET criteria. Mild stenosis of the bilateral vertebral artery origins. CT brain stroke protocol showed no acute intracranial hemorrhage or transcortical infarct.   Patient being admitted to [facility] to r/o stroke and further monitoring (04 Sep 2019 17:58)                            12.9   6.06  )-----------( 255      ( 05 Sep 2019 07:28 )             41.2       09-05    144  |  107  |  16  ----------------------------<  100<H>  4.1   |  24  |  0.76    Ca    9.6      05 Sep 2019 07:28  Mg     2.1     09-05    TPro  6.7  /  Alb  3.9  /  TBili  0.5  /  DBili  x   /  AST  13  /  ALT  7<L>  /  AlkPhos  97  09-05    Vital Signs Last 24 Hrs  T(C): 36.9 (06 Sep 2019 14:00), Max: 37.2 (05 Sep 2019 17:27)  T(F): 98.5 (06 Sep 2019 14:00), Max: 99 (05 Sep 2019 17:27)  HR: 92 (06 Sep 2019 13:25) (76 - 92)  BP: 143/87 (06 Sep 2019 13:25) (135/66 - 160/70)  BP(mean): 108 (06 Sep 2019 13:25) (92 - 108)  RR: 18 (06 Sep 2019 12:20) (17 - 19)  SpO2: 97% (06 Sep 2019 13:25) (93% - 97%)    MEDICATIONS  (STANDING):  aspirin enteric coated 162 milliGRAM(s) Oral daily  atorvastatin 80 milliGRAM(s) Oral at bedtime  clopidogrel Tablet 75 milliGRAM(s) Oral daily  heparin  Injectable 5000 Unit(s) SubCutaneous every 8 hours  influenza   Vaccine 0.5 milliLiter(s) IntraMuscular once  metoprolol tartrate 25 milliGRAM(s) Oral every 12 hours  pantoprazole    Tablet 40 milliGRAM(s) Oral before breakfast    MEDICATIONS  (PRN):  artificial tears (preservative free) Ophthalmic Solution 1 Drop(s) Both EYES two times a day PRN Dry Eyes  sodium chloride 0.65% Nasal 1 Spray(s) Both Nostrils four times a day PRN Nasal Congestion    Currently Undergoing Physical Therapy at bedside.    Functional Status Assessment:    Previous Level of Function:     · Ambulation Skills	independent; needs device	  · Transfer Skills	independent; needs device	  · ADL Skills	independent	  · Additional Comments	Pt lives alone in an apartment +elevator, 2 steps get into building. Prior to admission, pt ambulated independently with straight cane, pt also has standard walker, quad cane at home. Pt admits 6 falls this year.	    Cognitive Status Examination:   · Orientation	oriented to person, place, time and situation	  · Level of Consciousness	alert	  · Follows Commands and Answers Questions	able to follow multistep instructions; able to follow single-step instructions	  · Personal Safety and Judgment	intact	    Range of Motion Exam:   · Active Range of Motion Examination	AROM WFL through out	    Manual Muscle Testing:   · Manual Muscle Testing Results	b/l UEs~4+/5 t/o, b/l LEs ~4+/5 t/o	    Bed Mobility: Scooting/Bridging:     · Level of [place]	independent	    Bed Mobility: Sit to Supine:     · Level of [place]	independent	    Bed Mobility: Supine to Sit:     · Level of [place]	independent	    Transfer: Sit to Stand:     · Level of [place]	supervision	  · Physical Assist/Nonphysical Assist	supervision	  · Weight-Bearing Restrictions	weight-bearing as tolerated	  · Assistive Device	1 HHA /RW	    Transfer: Stand to Sit:     · Level of [place]	supervision	  · Physical Assist/Nonphysical Assist	supervision	  · Weight-Bearing Restrictions	weight-bearing as tolerated	  · Assistive Device	1HHA/RW	    Sit/Stand Transfer Safety Analysis:     · Impairments Contributing to Impaired Transfers	decreased balance	    Gait Skills:     · Level of [place]	100 feet with 1 HHA mimic as cane with CG--> close supervision, pt then ambulated~ 100 feet with rolling walker with supervision	  · Physical Assist/Nonphysical Assist	verbal cues; nonverbal cues (demo/gestures)	    Gait Analysis:     · Gait Pattern Used	3-point gait	  · Gait Deviations Noted	decreased poonam; increased time in double stance; decreased step length; fairly steady gait, slightly increased lateral sway, no lose of balance, decreased heel strike and hip flexion bilaterally., requires verbal cues from PT to maintain upright posture.	  · Impairments Contributing to Gait Deviations	decreased balance,	    Stair Negotiation:     · Level of [place]	CS/S	  · Physical Assist/Nonphysical Assist	verbal cues; nonverbal cues (demo/gestures)	  · Weight-Bearing Restrictions	weight-bearing as tolerated	  · Assistive Device	right rail up; left rail down	  · Stair Pattern	step to step	  · Number of Stairs	4	    Balance Skills Assessment:     · Sitting Balance: Static	good balance	  · Sitting Balance: Dynamic	good minus	  · Sit-to-Stand Balance	fair plus	  · Standing Balance: Static	fair plus	  · Standing Balance: Dynamic	fair balance	    Sensory Examination:   Sensory Examination:    Light Touch Sensation:   · Left UE	within normal limits	  · Right UE	within normal limits	  · Left LE	within normal limits	  · Right LE	within normal limits	    · Coordination Assessed	finger to nose; 5/5 bilaterally intact.	      Proprioception:   · Coordination Assessed	finger to nose; 5/5 bilaterally intact.	      Treatment Location:   · Comments	CN Testing: B/L Frontalis intact; B/L buccinator intact; smile symmetrical; tongue protrusion at midline; B/L eyes open/close intact; Shoulder elevation: intact bilaterally; Vision H-Test: bilateral tracking and smooth pursuit intact; Convergence/Divergence: intact; Vision Quadrant Test: intact bilaterally	    Clinical Impressions:   · Criteria for Skilled Therapeutic Interventions	impairments found; functional limitations in following categories; rehab potential; therapy frequency; anticipated discharge recommendation; anticipated equipment needs at discharge	  · Impairments Found (describe specific impairments)	gross motor; gait, locomotion, and balance; muscle strength	  · Functional Limitations in Following Categories (describe specific limitations)	self-care; home management; community/leisure	  · Rehab Potential	good, to achieve stated therapy goals	  · Therapy Frequency	2-3x/week	        PM&R Impression: as above    Disposition Plan Recommendations: d/c home, home P.T.

## 2019-09-06 NOTE — DISCHARGE NOTE NURSING/CASE MANAGEMENT/SOCIAL WORK - NSDCPEPTSTRK_GEN_ALL_CORE
Prescribed medications/Need for follow up after discharge/Stroke education booklet/Risk factors for stroke/Stroke warning signs and symptoms/Signs and symptoms of stroke/Call 911 for stroke/Stroke support groups for patients, families, and friends

## 2019-09-07 ENCOUNTER — TRANSCRIPTION ENCOUNTER (OUTPATIENT)
Age: 84
End: 2019-09-07

## 2019-09-07 DIAGNOSIS — I48.91 UNSPECIFIED ATRIAL FIBRILLATION: ICD-10-CM

## 2019-09-07 LAB
ALBUMIN SERPL ELPH-MCNC: 3.7 G/DL — SIGNIFICANT CHANGE UP (ref 3.3–5)
ALP SERPL-CCNC: 97 U/L — SIGNIFICANT CHANGE UP (ref 40–120)
ALT FLD-CCNC: 10 U/L — SIGNIFICANT CHANGE UP (ref 10–45)
ANION GAP SERPL CALC-SCNC: 11 MMOL/L — SIGNIFICANT CHANGE UP (ref 5–17)
AST SERPL-CCNC: 17 U/L — SIGNIFICANT CHANGE UP (ref 10–40)
BILIRUB SERPL-MCNC: 0.5 MG/DL — SIGNIFICANT CHANGE UP (ref 0.2–1.2)
BUN SERPL-MCNC: 11 MG/DL — SIGNIFICANT CHANGE UP (ref 7–23)
CALCIUM SERPL-MCNC: 9.3 MG/DL — SIGNIFICANT CHANGE UP (ref 8.4–10.5)
CHLORIDE SERPL-SCNC: 110 MMOL/L — HIGH (ref 96–108)
CO2 SERPL-SCNC: 21 MMOL/L — LOW (ref 22–31)
CREAT SERPL-MCNC: 0.68 MG/DL — SIGNIFICANT CHANGE UP (ref 0.5–1.3)
GLUCOSE SERPL-MCNC: 106 MG/DL — HIGH (ref 70–99)
HCT VFR BLD CALC: 39.6 % — SIGNIFICANT CHANGE UP (ref 34.5–45)
HGB BLD-MCNC: 12.8 G/DL — SIGNIFICANT CHANGE UP (ref 11.5–15.5)
MAGNESIUM SERPL-MCNC: 1.9 MG/DL — SIGNIFICANT CHANGE UP (ref 1.6–2.6)
MCHC RBC-ENTMCNC: 30.2 PG — SIGNIFICANT CHANGE UP (ref 27–34)
MCHC RBC-ENTMCNC: 32.3 GM/DL — SIGNIFICANT CHANGE UP (ref 32–36)
MCV RBC AUTO: 93.4 FL — SIGNIFICANT CHANGE UP (ref 80–100)
NRBC # BLD: 0 /100 WBCS — SIGNIFICANT CHANGE UP (ref 0–0)
PHOSPHATE SERPL-MCNC: 3.7 MG/DL — SIGNIFICANT CHANGE UP (ref 2.5–4.5)
PLATELET # BLD AUTO: 288 K/UL — SIGNIFICANT CHANGE UP (ref 150–400)
POTASSIUM SERPL-MCNC: 3.7 MMOL/L — SIGNIFICANT CHANGE UP (ref 3.5–5.3)
POTASSIUM SERPL-SCNC: 3.7 MMOL/L — SIGNIFICANT CHANGE UP (ref 3.5–5.3)
PROT SERPL-MCNC: 6.6 G/DL — SIGNIFICANT CHANGE UP (ref 6–8.3)
RBC # BLD: 4.24 M/UL — SIGNIFICANT CHANGE UP (ref 3.8–5.2)
RBC # FLD: 14 % — SIGNIFICANT CHANGE UP (ref 10.3–14.5)
SODIUM SERPL-SCNC: 142 MMOL/L — SIGNIFICANT CHANGE UP (ref 135–145)
WBC # BLD: 7.49 K/UL — SIGNIFICANT CHANGE UP (ref 3.8–10.5)
WBC # FLD AUTO: 7.49 K/UL — SIGNIFICANT CHANGE UP (ref 3.8–10.5)

## 2019-09-07 PROCEDURE — 71045 X-RAY EXAM CHEST 1 VIEW: CPT | Mod: 26

## 2019-09-07 PROCEDURE — 99223 1ST HOSP IP/OBS HIGH 75: CPT

## 2019-09-07 RX ORDER — CLOPIDOGREL BISULFATE 75 MG/1
1 TABLET, FILM COATED ORAL
Qty: 0 | Refills: 0 | DISCHARGE

## 2019-09-07 RX ORDER — APIXABAN 2.5 MG/1
1 TABLET, FILM COATED ORAL
Qty: 60 | Refills: 0
Start: 2019-09-07 | End: 2019-10-06

## 2019-09-07 RX ORDER — APIXABAN 2.5 MG/1
1 TABLET, FILM COATED ORAL
Qty: 0 | Refills: 0 | DISCHARGE
Start: 2019-09-07

## 2019-09-07 RX ORDER — POTASSIUM CHLORIDE 20 MEQ
20 PACKET (EA) ORAL ONCE
Refills: 0 | Status: COMPLETED | OUTPATIENT
Start: 2019-09-07 | End: 2019-09-07

## 2019-09-07 RX ORDER — ASPIRIN/CALCIUM CARB/MAGNESIUM 324 MG
1 TABLET ORAL
Qty: 0 | Refills: 0 | DISCHARGE

## 2019-09-07 RX ADMIN — ATORVASTATIN CALCIUM 80 MILLIGRAM(S): 80 TABLET, FILM COATED ORAL at 21:02

## 2019-09-07 RX ADMIN — APIXABAN 5 MILLIGRAM(S): 2.5 TABLET, FILM COATED ORAL at 10:14

## 2019-09-07 RX ADMIN — PANTOPRAZOLE SODIUM 40 MILLIGRAM(S): 20 TABLET, DELAYED RELEASE ORAL at 06:18

## 2019-09-07 RX ADMIN — Medication 100 MILLIGRAM(S): at 00:16

## 2019-09-07 RX ADMIN — Medication 20 MILLIEQUIVALENT(S): at 10:14

## 2019-09-07 RX ADMIN — Medication 25 MILLIGRAM(S): at 06:18

## 2019-09-07 RX ADMIN — Medication 25 MILLIGRAM(S): at 19:15

## 2019-09-07 RX ADMIN — APIXABAN 5 MILLIGRAM(S): 2.5 TABLET, FILM COATED ORAL at 21:02

## 2019-09-07 NOTE — PROGRESS NOTE ADULT - PROBLEM SELECTOR PLAN 1
Imaging as above  -C/w treatment as per stroke team recs  -ASA, Plavix, Statin  -Pending MRI head w/o contrast.
Closely follow neuro checks every 2-4 hours. CTH negative. JENNIFER w/ bubbles showed moderate tricuspid regurgitation and mild pulmonic regurgitation.  - Repeat HCT for acute changes in neuro exam  - f/u MRI Brain without contrast  - Goal SBP < 200  - PT/OT/SLP
Closely follow neuro checks every 2-4 hours. CTH negative. JENNIFER w/ bubbles showed moderate tricuspid regurgitation and mild pulmonic regurgitation.  - Repeat HCT for acute changes in neuro exam  - f/u MRI Brain without contrast  - Goal SBP < 200  - home PT
Closely follow neuro checks every 2-4 hours. CTH negative. JENNIFER w/ bubbles showed moderate tricuspid regurgitation and mild pulmonic regurgitation.  - Repeat HCT for acute changes in neuro exam  - f/u MRI Brain without contrast  - Goal SBP < 200  - home PT

## 2019-09-07 NOTE — DISCHARGE NOTE PROVIDER - NSDCFUADDAPPT_GEN_ALL_CORE_FT
You have an appointment with Dr. Wan's nurse practioner on 9/9/19 at 1:30PM.   Please call to confirm date and location before your appointment.    Please follow up with your primary care physician within 2 weeks.

## 2019-09-07 NOTE — PROGRESS NOTE ADULT - PROBLEM SELECTOR PLAN 4
Pt counseled on lifestyle modifications.
DVT: heparin subq, scd  PPI: none needed
DVT: heparin subq, scd  PPI: none needed
F: no IVF  E: replete as needed  N: DASH/DIET.

## 2019-09-07 NOTE — PROGRESS NOTE ADULT - SUBJECTIVE AND OBJECTIVE BOX
OVERNIGHT EVENTS:    SUBJECTIVE / INTERVAL HPI: Patient seen and examined at bedside. Denies f/c, n/v, HA, chest pain, SOB, abdominal pain, diarrhea, constipation, melena, hematochezia, hematuria, dysuria,    VITAL SIGNS:  Vital Signs Last 24 Hrs  T(C): 36.7 (07 Sep 2019 09:57), Max: 37.8 (06 Sep 2019 17:15)  T(F): 98 (07 Sep 2019 09:57), Max: 100 (06 Sep 2019 17:15)  HR: 82 (07 Sep 2019 08:25) (74 - 166)  BP: 145/76 (07 Sep 2019 08:25) (110/63 - 163/81)  BP(mean): 101 (07 Sep 2019 08:25) (82 - 127)  RR: 18 (07 Sep 2019 08:25) (17 - 18)  SpO2: 93% (07 Sep 2019 08:25) (93% - 97%)        PHYSICAL EXAM:  General: NAD, Laying comfortably in bed  HEENT: NC/AT, anicteric sclera, MMM  Neck: supple  Cardiovascular: +S1/S2, RRR, No murmurs, rubs, gallops  Respiratory: CTA B/L, no W/R/R  Gastrointestinal: soft, NT/ND, +BSx4  Extremities: WWP, no edema, clubbing or cyanosis  Vascular: 2+ radial, DP/PT pulses B/L  Neurological: AAOx3, no focal deficits    MEDICATIONS  (STANDING):  apixaban 5 milliGRAM(s) Oral every 12 hours  atorvastatin 80 milliGRAM(s) Oral at bedtime  influenza   Vaccine 0.5 milliLiter(s) IntraMuscular once  metoprolol tartrate 25 milliGRAM(s) Oral every 12 hours  pantoprazole    Tablet 40 milliGRAM(s) Oral before breakfast    MEDICATIONS  (PRN):  artificial tears (preservative free) Ophthalmic Solution 1 Drop(s) Both EYES two times a day PRN Dry Eyes  benzonatate 100 milliGRAM(s) Oral every 8 hours PRN Cough  sodium chloride 0.65% Nasal 1 Spray(s) Both Nostrils four times a day PRN Nasal Congestion    Allergies    codeine (Nausea)    Intolerances        LABS:                        12.8   7.49  )-----------( 288      ( 07 Sep 2019 05:56 )             39.6     09-07    142  |  110<H>  |  11  ----------------------------<  106<H>  3.7   |  21<L>  |  0.68    Ca    9.3      07 Sep 2019 05:57  Phos  3.7       Mg     1.9         TPro  6.6  /  Alb  3.7  /  TBili  0.5  /  DBili  x   /  AST  17  /  ALT  10  /  AlkPhos  97      PT/INR - ( 06 Sep 2019 18:11 )   PT: 11.0 sec;   INR: 0.97          PTT - ( 06 Sep 2019 18:11 )  PTT:45.6 sec  Urinalysis Basic - ( 06 Sep 2019 22:12 )    Color: Yellow / Appearance: Clear / S.010 / pH: x  Gluc: x / Ketone: NEGATIVE  / Bili: Negative / Urobili: 0.2 E.U./dL   Blood: x / Protein: NEGATIVE mg/dL / Nitrite: NEGATIVE   Leuk Esterase: Trace / RBC: < 5 /HPF / WBC < 5 /HPF   Sq Epi: x / Non Sq Epi: 0-5 /HPF / Bacteria: Present /HPF      CAPILLARY BLOOD GLUCOSE              RADIOLOGY & ADDITIONAL TESTS: Reviewed. OVERNIGHT EVENTS: GAUDENCIO    SUBJECTIVE / INTERVAL HPI: Patient seen and examined at bedside. Denies n/v, HA, chest pain, SOB, abdominal pain, diarrhea, constipation, melena, hematuria, dysuria    VITAL SIGNS:  Vital Signs Last 24 Hrs  T(C): 36.7 (07 Sep 2019 09:57), Max: 37.8 (06 Sep 2019 17:15)  T(F): 98 (07 Sep 2019 09:57), Max: 100 (06 Sep 2019 17:15)  HR: 82 (07 Sep 2019 08:25) (74 - 166)  BP: 145/76 (07 Sep 2019 08:25) (110/63 - 163/81)  BP(mean): 101 (07 Sep 2019 08:25) (82 - 127)  RR: 18 (07 Sep 2019 08:25) (17 - 18)  SpO2: 93% (07 Sep 2019 08:25) (93% - 97%)        Physical exam:  General: No acute distress, awake and alert  Cardiovascular: Regular rate and rhythm, no murmurs  Pulmonary: Anterior breath sounds clear bilaterally, no crackles or wheezing. No use of accessory muscles  Extremities: Radial and DP pulses +2, no edema    Neurologic:  -Mental status: Awake, alert, oriented to person, place, and time. Speech is fluent with intact naming, repetition, and comprehension, no dysarthria. Recent and remote memory intact. Follows commands. Attention/concentration intact. Fund of knowledge appropriate.  -Cranial nerves:   II: Visual fields are full to confrontation.  III, IV, VI: Extraocular movements are intact without nystagmus. Pupils equally round and reactive to light  V:  Facial sensation V1-V3 equal and intact   VII: Face is symmetric with normal eye closure and smile  VIII: Hearing is bilaterally intact to finger rub  IX, X: Uvula is midline and soft palate rises symmetrically  XI: Head turning and shoulder shrug are intact.  XII: Tongue protrudes midline  Motor: Normal bulk and tone. No pronator drift. Strength bilateral upper extremity 5/5, bilateral lower extremities 5/5.  Rapid alternating movements intact and symmetric  Sensation: Intact to light touch bilaterally  Coordination: No dysmetria on finger-to-nose  Gait: Narrow gait and steady        MEDICATIONS  (STANDING):  apixaban 5 milliGRAM(s) Oral every 12 hours  atorvastatin 80 milliGRAM(s) Oral at bedtime  influenza   Vaccine 0.5 milliLiter(s) IntraMuscular once  metoprolol tartrate 25 milliGRAM(s) Oral every 12 hours  pantoprazole    Tablet 40 milliGRAM(s) Oral before breakfast    MEDICATIONS  (PRN):  artificial tears (preservative free) Ophthalmic Solution 1 Drop(s) Both EYES two times a day PRN Dry Eyes  benzonatate 100 milliGRAM(s) Oral every 8 hours PRN Cough  sodium chloride 0.65% Nasal 1 Spray(s) Both Nostrils four times a day PRN Nasal Congestion    Allergies    codeine (Nausea)    Intolerances        LABS:                        12.8   7.49  )-----------( 288      ( 07 Sep 2019 05:56 )             39.6         142  |  110<H>  |  11  ----------------------------<  106<H>  3.7   |  21<L>  |  0.68    Ca    9.3      07 Sep 2019 05:57  Phos  3.7       Mg     1.9         TPro  6.6  /  Alb  3.7  /  TBili  0.5  /  DBili  x   /  AST  17  /  ALT  10  /  AlkPhos  97  07    PT/INR - ( 06 Sep 2019 18:11 )   PT: 11.0 sec;   INR: 0.97          PTT - ( 06 Sep 2019 18:11 )  PTT:45.6 sec  Urinalysis Basic - ( 06 Sep 2019 22:12 )    Color: Yellow / Appearance: Clear / S.010 / pH: x  Gluc: x / Ketone: NEGATIVE  / Bili: Negative / Urobili: 0.2 E.U./dL   Blood: x / Protein: NEGATIVE mg/dL / Nitrite: NEGATIVE   Leuk Esterase: Trace / RBC: < 5 /HPF / WBC < 5 /HPF   Sq Epi: x / Non Sq Epi: 0-5 /HPF / Bacteria: Present /HPF      CAPILLARY BLOOD GLUCOSE              RADIOLOGY & ADDITIONAL TESTS: Reviewed.

## 2019-09-07 NOTE — PROGRESS NOTE ADULT - PROBLEM SELECTOR PROBLEM 2
CAD (coronary artery disease)
Atrial fibrillation

## 2019-09-07 NOTE — PROGRESS NOTE ADULT - ASSESSMENT
89 year old F with PMHx of CAD, CABG x3, CVA (chronic left occipital lobe), spinal stenosis, sciatica s/p 3 epidural injections for left leg pain who presents with multiple episodes of word finding difficulties since 1 day ago and left facial numbness admitted to stroke telemetry to r/o stroke, pending MRI 89 year old F with PMHx of CAD, CABG x3, CVA (chronic left occipital lobe), spinal stenosis, sciatica s/p 3 epidural injections for left leg pain who presents with multiple episodes of word finding difficulties since 1 day ago and left facial numbness admitted to stroke telemetry to r/o stroke, c/b new onset AFib, started on Eliquis

## 2019-09-07 NOTE — PROGRESS NOTE ADULT - PROBLEM SELECTOR PLAN 2
ASA, Plavix  -Will need to be restarted on BB and ACE-i when appropriate.
History of CAD, CABG x3, pre-DM (hga1c 6.1 on 9/4). On home aspirin and plavix. Accumetrics p2y12 was 145, suggesting that home plavix medication has been effective. Platelet response aspirin result was 546. TSH: 4.2, triglycerides 152, cholesteral 166.  - c/w aspirin 81  - c/w plavix 75
History of CAD, CABG x3, pre-DM (hga1c 6.1 on 9/4). On home aspirin and plavix. Accumetrics p2y12 was 145, suggesting that home plavix medication has been effective. Platelet response aspirin result was 546. TSH: 4.2, triglycerides 152, cholesteral 166.  - c/w aspirin 81  - c/w plavix 75
New onset Afib during hospital course  - c/w eliquis 5mg q12

## 2019-09-07 NOTE — PROGRESS NOTE ADULT - PROBLEM SELECTOR PROBLEM 3
Prediabetes
Nutrition, metabolism, and development symptoms
Nutrition, metabolism, and development symptoms
CAD (coronary artery disease)

## 2019-09-07 NOTE — PROGRESS NOTE ADULT - PROBLEM SELECTOR PROBLEM 4
Overweight (BMI 25.0-29.9)
Prophylactic measure
Prophylactic measure
Nutrition, metabolism, and development symptoms

## 2019-09-07 NOTE — PROGRESS NOTE ADULT - PROBLEM SELECTOR PLAN 3
History of CAD, CABG x3, pre-DM (hga1c 6.1 on 9/4). On home aspirin and plavix. Accumetrics p2y12 was 145, suggesting that home plavix medication has been effective. Platelet response aspirin result was 546. TSH: 4.2, triglycerides 152, cholesteral 166.  - c/w aspirin 81  - c/w plavix 75 History of CAD, CABG x3, pre-DM (hga1c 6.1 on 9/4). On home aspirin and plavix. Accumetrics p2y12 was 145, suggesting that home plavix medication has been effective. Platelet response aspirin result was 546. TSH: 4.2, triglycerides 152, cholesteral 166.  - d/c aspirin 81  - d/c plavix 75

## 2019-09-07 NOTE — DISCHARGE NOTE PROVIDER - NSDCCPCAREPLAN_GEN_ALL_CORE_FT
PRINCIPAL DISCHARGE DIAGNOSIS  Diagnosis: CVA (cerebral vascular accident)  Assessment and Plan of Treatment: You were admitted to the hospital to rule out an acute stroke. During your hospital admission, we performed various tests and examiantions such as CT scan of the head and MRI of the brain It was found that you you had some narrowing in one of your cerebral arteries, however you did not experience a stroke. It is important that you continue to take your medications as prescribed with skipped dosages. If you begin to experience weakness or numbess or increased difficulty in finding words, please seek medical attention even if your symptoms resolve.        SECONDARY DISCHARGE DIAGNOSES  Diagnosis: Atrial fibrillation  Assessment and Plan of Treatment: During this hospital admission you were found to have newly developed Atrial Fibrillation. This is an irregular, often rapid heart rate that commonly causes poor blood flow. The heart's upper chambers (atria) beat out of coordination with the lower chambers (ventricles). This condition may have no symptoms, but when symptoms do appear they include palpitations, shortness of breath, and fatigue. Treatments include drugs, electrical shock (cardioversion), and minimally invasive surgery (ablation). Continue to take your blood thinner and rate controlling medications as directed. Please continue to take your medications as prescribed and follow up with your primary care doctor and cardiologist in 10-14 days.

## 2019-09-07 NOTE — DISCHARGE NOTE PROVIDER - HOSPITAL COURSE
89 year old F with PMHx of CAD, CABG x3, CVA (chronic left occipital lobe), spinal stenosis, sciatica s/p 3 epidural injections for Left leg pain who presents with multiple episodes of word finding difficulties and left facial numbness and admitted for stroke r/o, c/b new onset AFib and SVT episode. In the ED: T: 98, HR 66-70, -127/57-74, RR 16-18, SpO2 92-99 RA. Labs significant only for elevated triglycerides 174. CTH negative. CTA head showed significant stenosis of the internal carotid arteries per NASCET criteria. Mild stenosis of the bilateral vertebral artery origins. CT brain stroke protocol showed no acute intracranial hemorrhage or transcortical infarct. On 9/6, patient experienced AFib RVR with HR 140s, corrected after Metroprolol administration. Patient was started on Eliquis 5mg BID for new onset AFib. Patient will be discharged on home PT/OT instructed to follow up with Dr. Wan's NP in 2 weeks. 89 year old F with PMHx of CAD, CABG x3, CVA (chronic left occipital lobe), spinal stenosis, sciatica s/p 3 epidural injections for Left leg pain who presents with multiple episodes of word finding difficulties and left facial numbness and admitted for stroke r/o, c/b new onset AFib and SVT episode. In the ED: T: 98, HR 66-70, -127/57-74, RR 16-18, SpO2 92-99 RA. Labs significant only for elevated triglycerides 174. CTH negative. CTA head showed significant stenosis of the internal carotid arteries per NASCET criteria. Mild stenosis of the bilateral vertebral artery origins. CT brain stroke protocol showed no acute intracranial hemorrhage or transcortical infarct. On 9/6, patient experienced AFib RVR with HR 140s, corrected after Metroprolol administration. Patient was started on Eliquis 5mg BID for new onset AFib. Home medications plavix and aspirin ar to be discontinued. Patient will be discharged on home PT/OT instructed to follow up with Dr. Wan's NP in 2 weeks.

## 2019-09-07 NOTE — DISCHARGE NOTE PROVIDER - CARE PROVIDER_API CALL
Chary Wan)  Neurology; Vascular Neurology  [address], [address] [place] [address]  Phone: [phone]  Fax: [phone]  Follow Up Time:

## 2019-09-07 NOTE — DISCHARGE NOTE PROVIDER - NSDCCPTREATMENT_GEN_ALL_CORE_FT
PRINCIPAL PROCEDURE  Procedure: MRI brain wo contrast  Findings and Treatment: No hydrocephalus, midline shift, acute intracranial   hemorrhage or acute infarct. Mild chronic microangiopathic ischemic   changes involving the periventricular white matter. Chronic infarct at   the left posterior temporal lobe.

## 2019-09-08 VITALS — TEMPERATURE: 98 F

## 2019-09-08 PROCEDURE — 71045 X-RAY EXAM CHEST 1 VIEW: CPT

## 2019-09-08 PROCEDURE — 80061 LIPID PANEL: CPT

## 2019-09-08 PROCEDURE — 99231 SBSQ HOSP IP/OBS SF/LOW 25: CPT

## 2019-09-08 PROCEDURE — 87086 URINE CULTURE/COLONY COUNT: CPT

## 2019-09-08 PROCEDURE — 80048 BASIC METABOLIC PNL TOTAL CA: CPT

## 2019-09-08 PROCEDURE — 70496 CT ANGIOGRAPHY HEAD: CPT

## 2019-09-08 PROCEDURE — 83735 ASSAY OF MAGNESIUM: CPT

## 2019-09-08 PROCEDURE — 70551 MRI BRAIN STEM W/O DYE: CPT

## 2019-09-08 PROCEDURE — 85027 COMPLETE CBC AUTOMATED: CPT

## 2019-09-08 PROCEDURE — 82962 GLUCOSE BLOOD TEST: CPT

## 2019-09-08 PROCEDURE — 36415 COLL VENOUS BLD VENIPUNCTURE: CPT

## 2019-09-08 PROCEDURE — 85576 BLOOD PLATELET AGGREGATION: CPT

## 2019-09-08 PROCEDURE — 93306 TTE W/DOPPLER COMPLETE: CPT

## 2019-09-08 PROCEDURE — 99291 CRITICAL CARE FIRST HOUR: CPT | Mod: 25

## 2019-09-08 PROCEDURE — 81001 URINALYSIS AUTO W/SCOPE: CPT

## 2019-09-08 PROCEDURE — 85610 PROTHROMBIN TIME: CPT

## 2019-09-08 PROCEDURE — 85730 THROMBOPLASTIN TIME PARTIAL: CPT

## 2019-09-08 PROCEDURE — 92523 SPEECH SOUND LANG COMPREHEN: CPT

## 2019-09-08 PROCEDURE — 84443 ASSAY THYROID STIM HORMONE: CPT

## 2019-09-08 PROCEDURE — 97530 THERAPEUTIC ACTIVITIES: CPT

## 2019-09-08 PROCEDURE — 97162 PT EVAL MOD COMPLEX 30 MIN: CPT

## 2019-09-08 PROCEDURE — 0042T: CPT

## 2019-09-08 PROCEDURE — 83036 HEMOGLOBIN GLYCOSYLATED A1C: CPT

## 2019-09-08 PROCEDURE — 83605 ASSAY OF LACTIC ACID: CPT

## 2019-09-08 PROCEDURE — 84100 ASSAY OF PHOSPHORUS: CPT

## 2019-09-08 PROCEDURE — 84484 ASSAY OF TROPONIN QUANT: CPT

## 2019-09-08 PROCEDURE — 97161 PT EVAL LOW COMPLEX 20 MIN: CPT

## 2019-09-08 PROCEDURE — 70498 CT ANGIOGRAPHY NECK: CPT

## 2019-09-08 PROCEDURE — 93005 ELECTROCARDIOGRAM TRACING: CPT

## 2019-09-08 PROCEDURE — 80076 HEPATIC FUNCTION PANEL: CPT

## 2019-09-08 PROCEDURE — 85025 COMPLETE CBC W/AUTO DIFF WBC: CPT

## 2019-09-08 PROCEDURE — 80053 COMPREHEN METABOLIC PANEL: CPT

## 2019-09-08 PROCEDURE — 70450 CT HEAD/BRAIN W/O DYE: CPT

## 2019-09-08 RX ORDER — APIXABAN 2.5 MG/1
1 TABLET, FILM COATED ORAL
Qty: 14 | Refills: 0
Start: 2019-09-08 | End: 2019-09-14

## 2019-09-08 RX ORDER — LANOLIN ALCOHOL/MO/W.PET/CERES
3 CREAM (GRAM) TOPICAL ONCE
Refills: 0 | Status: COMPLETED | OUTPATIENT
Start: 2019-09-08 | End: 2019-09-08

## 2019-09-08 RX ORDER — ONDANSETRON 8 MG/1
4 TABLET, FILM COATED ORAL ONCE
Refills: 0 | Status: COMPLETED | OUTPATIENT
Start: 2019-09-08 | End: 2019-09-08

## 2019-09-08 RX ORDER — APIXABAN 2.5 MG/1
1 TABLET, FILM COATED ORAL
Qty: 60 | Refills: 0
Start: 2019-09-08 | End: 2019-10-07

## 2019-09-08 RX ORDER — ONDANSETRON 8 MG/1
4 TABLET, FILM COATED ORAL ONCE
Refills: 0 | Status: DISCONTINUED | OUTPATIENT
Start: 2019-09-08 | End: 2019-09-08

## 2019-09-08 RX ORDER — SIMETHICONE 80 MG/1
80 TABLET, CHEWABLE ORAL ONCE
Refills: 0 | Status: COMPLETED | OUTPATIENT
Start: 2019-09-08 | End: 2019-09-08

## 2019-09-08 RX ADMIN — PANTOPRAZOLE SODIUM 40 MILLIGRAM(S): 20 TABLET, DELAYED RELEASE ORAL at 06:46

## 2019-09-08 RX ADMIN — Medication 3 MILLIGRAM(S): at 00:37

## 2019-09-08 RX ADMIN — Medication 25 MILLIGRAM(S): at 06:46

## 2019-09-08 RX ADMIN — ONDANSETRON 4 MILLIGRAM(S): 8 TABLET, FILM COATED ORAL at 16:09

## 2019-09-08 RX ADMIN — APIXABAN 5 MILLIGRAM(S): 2.5 TABLET, FILM COATED ORAL at 10:27

## 2019-09-12 DIAGNOSIS — I25.10 ATHEROSCLEROTIC HEART DISEASE OF NATIVE CORONARY ARTERY WITHOUT ANGINA PECTORIS: ICD-10-CM

## 2019-09-12 DIAGNOSIS — I66.9 OCCLUSION AND STENOSIS OF UNSPECIFIED CEREBRAL ARTERY: ICD-10-CM

## 2019-09-12 DIAGNOSIS — M54.30 SCIATICA, UNSPECIFIED SIDE: ICD-10-CM

## 2019-09-12 DIAGNOSIS — I48.91 UNSPECIFIED ATRIAL FIBRILLATION: ICD-10-CM

## 2019-09-12 DIAGNOSIS — R73.03 PREDIABETES: ICD-10-CM

## 2019-09-12 DIAGNOSIS — E66.9 OBESITY, UNSPECIFIED: ICD-10-CM

## 2019-09-12 DIAGNOSIS — Z88.6 ALLERGY STATUS TO ANALGESIC AGENT: ICD-10-CM

## 2019-09-12 DIAGNOSIS — R20.0 ANESTHESIA OF SKIN: ICD-10-CM

## 2019-09-12 DIAGNOSIS — M48.00 SPINAL STENOSIS, SITE UNSPECIFIED: ICD-10-CM

## 2019-09-12 DIAGNOSIS — Z95.1 PRESENCE OF AORTOCORONARY BYPASS GRAFT: ICD-10-CM

## 2019-09-19 ENCOUNTER — APPOINTMENT (OUTPATIENT)
Dept: NEUROLOGY | Facility: CLINIC | Age: 84
End: 2019-09-19
Payer: MEDICARE

## 2019-09-19 VITALS
DIASTOLIC BLOOD PRESSURE: 70 MMHG | HEART RATE: 76 BPM | HEIGHT: 57 IN | SYSTOLIC BLOOD PRESSURE: 112 MMHG | OXYGEN SATURATION: 94 %

## 2019-09-19 DIAGNOSIS — I63.9 CEREBRAL INFARCTION, UNSPECIFIED: ICD-10-CM

## 2019-09-19 DIAGNOSIS — I48.91 UNSPECIFIED ATRIAL FIBRILLATION: ICD-10-CM

## 2019-09-19 PROCEDURE — 99214 OFFICE O/P EST MOD 30 MIN: CPT

## 2019-09-19 PROCEDURE — 93880 EXTRACRANIAL BILAT STUDY: CPT

## 2019-09-19 RX ORDER — APIXABAN 5 MG/1
5 TABLET, FILM COATED ORAL
Refills: 0 | Status: ACTIVE | COMMUNITY

## 2019-09-19 RX ORDER — CLOPIDOGREL BISULFATE 75 MG/1
75 TABLET, FILM COATED ORAL
Qty: 90 | Refills: 0 | Status: DISCONTINUED | COMMUNITY
Start: 2017-06-08 | End: 2019-09-19

## 2019-09-19 RX ORDER — ASPIRIN 81 MG
81 TABLET, DELAYED RELEASE (ENTERIC COATED) ORAL DAILY
Refills: 0 | Status: DISCONTINUED | COMMUNITY
End: 2019-09-19

## 2019-09-19 RX ORDER — ALPRAZOLAM 2 MG/1
2 TABLET ORAL
Refills: 0 | Status: ACTIVE | COMMUNITY

## 2019-09-19 RX ORDER — DULOXETINE HYDROCHLORIDE 60 MG/1
60 CAPSULE, DELAYED RELEASE PELLETS ORAL TWICE DAILY
Refills: 0 | Status: ACTIVE | COMMUNITY

## 2019-09-20 NOTE — ASSESSMENT
[FreeTextEntry1] : \par Plan for Stroke Factors\par -STOP Score/Sleep study: patient scored high risk for sleep apnea and will have a home sleep study done. Patient scored low risk for sleep apnea; at this moment, home sleep study is not applicable. Discussed the risks of untreated sleep apnea and one of the likelihood cause of strokes. \par -Exercise Rx (Salaso): patient prescribed an 8 week regimen of exercises to improve strength and symptoms from the stroke. \par -Carotid Doppler ultrasound: discussed with patient the importance and need for ultrasound; if normal, repeat in a year. If abnormal, will notify patient. Repeat in 6 months. \par \par \par

## 2019-09-20 NOTE — PHYSICAL EXAM
[Sclera] : the sclera and conjunctiva were normal [Outer Ear] : the ears and nose were normal in appearance [Neck Appearance] : the appearance of the neck was normal [Neck Cervical Mass (___cm)] : no neck mass was observed [Respiration, Rhythm And Depth] : normal respiratory rhythm and effort [Exaggerated Use Of Accessory Muscles For Inspiration] : no accessory muscle use [Auscultation Breath Sounds / Voice Sounds] : lungs were clear to auscultation bilaterally [Heart Rate And Rhythm] : heart rate was normal and rhythm regular [Heart Sounds] : normal S1 and S2 [Arterial Pulses Carotid] : carotid pulses were normal with no bruits [No CVA Tenderness] : no ~M costovertebral angle tenderness [No Spinal Tenderness] : no spinal tenderness [Skin Color & Pigmentation] : normal skin color and pigmentation [Skin Turgor] : normal skin turgor [] : no rash [Skin Lesions] : no skin lesions [FreeTextEntry1] : Constitutional: alert, in no acute distress, well nourished and well developed.\par Psychiatric:  insight and judgment were intact.\par \par Neurologic: \par Mental Status: The patient is alert, attentive, and oriented to person, place, and time. Speech is clear and fluent with good repetition, comprehension, and naming.  \par Cranial nerves:\par CN II: Visual fields are full to confrontation.Visual acuity is intact. \par CN III, IV, VI: EOMI, PERRLA\par CN V: Facial sensation is intact to pinprick in all 3 divisions bilaterally. Corneal responses are intact.\par CN VII: Face is symmetric with normal eye closure and smile.\par CN VIII: Hearing is normal to rubbing fingers\par CN IX, X: Palate elevates symmetrically. Phonation is normal.\par CN XI: Head turning and shoulder shrug are intact and symmetric.\par CN XII: Tongue is midline with normal movements and no atrophy and no deviation with protrusion.\par Motor: There is no pronator drift of out-stretched arms. Muscle bulk and tone is normal. Strength is full bilaterally 5/5 on both UE and both LE. \par Sensation: light touch is intact; pinprick intact; vibration b/l intact.\par Reflexes:Reflexes are 2+ and symmetric at the biceps, triceps, knees, and ankles.\par Coordination: Rapid alternating movements and fine finger movements are intact. There is no dysmetria on finger-to-nose and heel-knee-shin. There are no abnormal or extraneous movements. Negative Romberg. \par Gait/Stance: Posture is normal. Gait is steady but ambulates with a cane. \par \par Eyes: sclera and conjunctiva were normal.\par \par

## 2019-09-20 NOTE — HISTORY OF PRESENT ILLNESS
[FreeTextEntry1] : 89 year old F patient with PMHx of CAD, CABG x3, CVA (chronic left occipital lobe), spinal stenosis, sciatica s/p 3 epidural injections for Left leg pain presents for post hospital follow up visit for 9/4-9/7. Present at the visit is her daughter. \par \par The patient had presented to the ED when she had word finding difficulties and left facial numbness. \par CT brain stroke protocol showed no acute intracranial hemorrhage or transcortical infarct. Then on 9/6, \par Patient experienced AFib RVR with HR 140s, which was corrected after Metroprolol administration. Patient was started on Eliquis 5mg BID for new onset AFib. \par \par Patient reports adherence to her medications and denies any ADEs. She denies feeling any palpitations for her Afib. \par Diet: eats pint of ice cream every day, pasta\par Exercise: used to walk a lot- uses cane and doesn't like walker\par \par She is currently doing physical therapy twice a week; and started OT last week. \par \par Denies speech deficits, says she used to sing but her singing is at night time and she get too tired. She does report fatigue a lot during the daytime and naps. She denies facial numbness, no trouble searching for words- occasional slips. She reports headaches rarely. \par \par Dr. Gonzalo Marvin- PCP had physical exam last week \par

## 2019-09-20 NOTE — PROCEDURE
[FreeTextEntry1] : STOP/SCORE\par \par 1. Snoring\par Do you snore loudly (louder than talking or loud enough to be heard through closed doors? no\par \par 2. Tired\par Do you often feel tired, fatigued, or sleepy during daytime? yes\par \par 3. Observed\par Has anyone observed you stop breathing during your sleep? no\par \par 4. Blood Pressure\par Do you have or are you being treated for high blood pressure? no\par \par 5. BMI\par Is your BMI more than 35 kg/m2? no\par \par 6. Age\par Is your age over 50 years old? yes\par \par 7. Neck Circumference\par Is your neck circumference greater than 40cm? n/a\par \par 8. Gender\par Is your gender male? no\par \par \par Low Risk of Sleep Apnea <3\par

## 2019-09-20 NOTE — DATA REVIEWED
[de-identified] : \par EXAM:  MR BRAIN                      \par PROCEDURE DATE:  09/06/2019  \par INTERPRETATION:  PROCEDURE: MRI BRAIN without contrast\par INDICATION: Presenting with left facial droop. Evaluate for CVA.\par TECHNIQUE: Sagittal T1, axial T1, T2, FLAIR, diffusion, GRE and coronal \par FLAIR images of the brain were obtained.\par COMPARISON: CT and CTA of the head from 9/4/2019, stroke protocol.\par \par FINDINGS: There is ventricular and sulcal prominence consistent with \par generalized age related cerebral volume loss. There is confluent \par hyperintense T2 signal throughout the periventricular white matter \par consistent with chronic microvascular ischemic changes. Chronic infarct \par within the left posterior temporal lobe. There is no restricted diffusion \par to suggest acute infarction. No acute intracranial hemorrhage. \par Intracranial arterial vasculature demonstrates T2 signal void consistent \par with patent vascular flow. The patient has had prior bilateral ocular \par lens replacement. Paranasal sinuses and mastoids are well-pneumatized no \par expansion of the sella turcica. Craniocervical junction is patent.\par \par IMPRESSION: No hydrocephalus, midline shift, acute intracranial \par hemorrhage or acute infarct. Mild chronic microangiopathic ischemic \par changes involving the periventricular white matter. Chronic infarct at \par the left posterior temporal lobe. \par \par  [de-identified] : Labs 9/2019\par A1c 6.1\par Total Chol 166\par \par HDL 54\par LDL 81\par TSH 4.195\par

## 2019-09-21 ENCOUNTER — EMERGENCY (EMERGENCY)
Facility: HOSPITAL | Age: 84
LOS: 1 days | Discharge: HOME | End: 2019-09-21
Attending: EMERGENCY MEDICINE | Admitting: EMERGENCY MEDICINE
Payer: MEDICARE

## 2019-09-21 VITALS
RESPIRATION RATE: 16 BRPM | OXYGEN SATURATION: 95 % | WEIGHT: 125 LBS | SYSTOLIC BLOOD PRESSURE: 93 MMHG | TEMPERATURE: 97 F | DIASTOLIC BLOOD PRESSURE: 59 MMHG | HEART RATE: 82 BPM

## 2019-09-21 VITALS
RESPIRATION RATE: 16 BRPM | DIASTOLIC BLOOD PRESSURE: 71 MMHG | OXYGEN SATURATION: 98 % | HEART RATE: 76 BPM | SYSTOLIC BLOOD PRESSURE: 137 MMHG

## 2019-09-21 DIAGNOSIS — Z95.1 PRESENCE OF AORTOCORONARY BYPASS GRAFT: Chronic | ICD-10-CM

## 2019-09-21 LAB
APPEARANCE UR: CLEAR — SIGNIFICANT CHANGE UP
BILIRUB UR-MCNC: NEGATIVE — SIGNIFICANT CHANGE UP
COLOR SPEC: YELLOW — SIGNIFICANT CHANGE UP
DIFF PNL FLD: NEGATIVE — SIGNIFICANT CHANGE UP
GLUCOSE UR QL: NEGATIVE — SIGNIFICANT CHANGE UP
HCT VFR BLD CALC: 40 % — SIGNIFICANT CHANGE UP (ref 34.5–45)
HGB BLD-MCNC: 12.5 G/DL — SIGNIFICANT CHANGE UP (ref 11.5–15.5)
KETONES UR-MCNC: NEGATIVE — SIGNIFICANT CHANGE UP
LEUKOCYTE ESTERASE UR-ACNC: NEGATIVE — SIGNIFICANT CHANGE UP
MCHC RBC-ENTMCNC: 29.8 PG — SIGNIFICANT CHANGE UP (ref 27–34)
MCHC RBC-ENTMCNC: 31.3 GM/DL — LOW (ref 32–36)
MCV RBC AUTO: 95.2 FL — SIGNIFICANT CHANGE UP (ref 80–100)
NITRITE UR-MCNC: NEGATIVE — SIGNIFICANT CHANGE UP
NRBC # BLD: 0 /100 WBCS — SIGNIFICANT CHANGE UP (ref 0–0)
PH UR: 6 — SIGNIFICANT CHANGE UP (ref 5–8)
PLATELET # BLD AUTO: 276 K/UL — SIGNIFICANT CHANGE UP (ref 150–400)
PROT UR-MCNC: NEGATIVE MG/DL — SIGNIFICANT CHANGE UP
RBC # BLD: 4.2 M/UL — SIGNIFICANT CHANGE UP (ref 3.8–5.2)
RBC # FLD: 13.8 % — SIGNIFICANT CHANGE UP (ref 10.3–14.5)
SP GR SPEC: 1.01 — SIGNIFICANT CHANGE UP (ref 1–1.03)
UROBILINOGEN FLD QL: 0.2 E.U./DL — SIGNIFICANT CHANGE UP
WBC # BLD: 11.24 K/UL — HIGH (ref 3.8–10.5)
WBC # FLD AUTO: 11.24 K/UL — HIGH (ref 3.8–10.5)

## 2019-09-21 PROCEDURE — 99285 EMERGENCY DEPT VISIT HI MDM: CPT

## 2019-09-21 PROCEDURE — 73060 X-RAY EXAM OF HUMERUS: CPT | Mod: 26,LT

## 2019-09-21 PROCEDURE — 73030 X-RAY EXAM OF SHOULDER: CPT | Mod: 26,LT

## 2019-09-21 PROCEDURE — 73070 X-RAY EXAM OF ELBOW: CPT | Mod: 26,LT

## 2019-09-21 PROCEDURE — 73090 X-RAY EXAM OF FOREARM: CPT | Mod: 26,LT

## 2019-09-21 RX ORDER — MORPHINE SULFATE 50 MG/1
2 CAPSULE, EXTENDED RELEASE ORAL ONCE
Refills: 0 | Status: DISCONTINUED | OUTPATIENT
Start: 2019-09-21 | End: 2019-09-21

## 2019-09-21 RX ADMIN — MORPHINE SULFATE 2 MILLIGRAM(S): 50 CAPSULE, EXTENDED RELEASE ORAL at 22:22

## 2019-09-21 RX ADMIN — MORPHINE SULFATE 2 MILLIGRAM(S): 50 CAPSULE, EXTENDED RELEASE ORAL at 20:42

## 2019-09-21 NOTE — ED PROVIDER NOTE - CARE PROVIDER_API CALL
Vanessa Rosa)  Orthopaedic Surgery Surgery  [address] [id number]  Phone: [phone]  Fax: [phone]  Follow Up Time:

## 2019-09-21 NOTE — ED ADULT NURSE NOTE - OBJECTIVE STATEMENT
pt to ER w/ report of mech fall today after tripping on her walker and subsequently injuring L. elbow.  Pt denies cp [facility] and subsequently injuring L. elbow.  Pt denies cp/sob/f/c/n/v.  No neuro deficits noted.  Breathing unlabored, skin warm and dry. Will continue to monitor. [facility]pping on her walker and subsequently injuring L. elbow.  Swelling and deformity to L elbow noted.  Ecchymosis to chin and R eyebrow noted. Pt denies cp/sob/f/c/n/v.  No neuro deficits noted.  Breathing unlabored, skin warm and dry. Will continue to monitor.

## 2019-09-21 NOTE — ED PROVIDER NOTE - NSFOLLOWUPINSTRUCTIONS_ED_ALL_ED_FT
Elbow Fracture    WHAT YOU NEED TO KNOW:    An elbow fracture is a break in one or more of the 3 bones that form your elbow joint. An elbow fracture is often caused by an injury. An example is a fall onto an outstretched hand with a bent elbow. Osteoporosis (brittle bones) can increase your risk for an elbow fracture.Adult Arm Bones         DISCHARGE INSTRUCTIONS:    Seek care immediately if:     Your skin becomes swollen, cold, or pale.      Your elbow, hand, or fingers are numb.    Contact your healthcare provider if:     You have a fever.      The pain gets worse, even after you rest and take your medicine.      You have new or more trouble moving your arm.      You have new sores around the area of your brace, splint, or cast.      Your brace, splint, or cast becomes damaged.      You have questions or concerns about your condition or care.    Medicines: You may need any of the following:     Prescription pain medicine may be given. Ask your healthcare provider how to take this medicine safely. Some prescription pain medicines contain acetaminophen. Do not take other medicines that contain acetaminophen without talking to your healthcare provider. Too much acetaminophen may cause liver damage. Prescription pain medicine may cause constipation. Ask your healthcare provider how to prevent or treat constipation.       NSAIDs, such as ibuprofen, help decrease swelling, pain, and fever. This medicine is available with or without a doctor's order. NSAIDs can cause stomach bleeding or kidney problems in certain people. If you take blood thinner medicine, always ask your healthcare provider if NSAIDs are safe for you. Always read the medicine label and follow directions.      Take your medicine as directed. Contact your healthcare provider if you think your medicine is not helping or if you have side effects. Tell him or her if you are allergic to any medicine. Keep a list of the medicines, vitamins, and herbs you take. Include the amounts, and when and why you take them. Bring the list or the pill bottles to follow-up visits. Carry your medicine list with you in case of an emergency.    Self-care:     Elevate your elbow above the level of your heart as often as you can. This will help decrease swelling and pain. Prop your elbow on pillows or blankets to keep it elevated comfortably. While your elbow is elevated, wiggle your fingers and open and close them to prevent hand stiffness.      Apply ice on your elbow on your elbow for 15 to 20 minutes every hour or as directed. Use an ice pack, or put crushed ice in a plastic bag. Cover it with a towel. Ice helps prevent tissue damage and decreases swelling and pain.      Go to physical therapy as directed. A physical therapist can teach you exercises to help improve movement and strength and to decrease pain.    Care for your brace, cast, or splint: Follow instructions about when you may take a bath or shower. It is important not to get your brace, cast, or splint wet. Cover your device with a plastic bag before you bathe. Tape the bag to your skin above the device to help keep out water. Hold your elbow away from the water in case the bag breaks.    Check the skin around your cast, brace, or splint daily for any redness or open skin.      Do not use a sharp or pointed object to scratch your skin under the cast, brace, or splint.      Do not remove your brace or splint unless directed.    Follow up with your healthcare provider as directed: You may need to have your brace, splint, cast, or stitches removed. You may need x-rays to check how well the bones are healing. Write down your questions so you remember to ask them during your visits.

## 2019-09-21 NOTE — ED PROVIDER NOTE - PHYSICAL EXAMINATION
CONSTITUTIONAL: Well appearing, well nourished, awake, alert and in no apparent distress.  HEENT: Head is atraumatic. Eyes clear bilaterally, normal EOMI. Airway patent.  CARDIAC: Normal rate, regular rhythm.  Heart sounds S1, S2.   RESPIRATORY: Breath sounds clear and equal bilaterally. no tachypnea, respiratory distress.   GASTROINTESTINAL: Abdomen soft, non-tender, no guarding, distension.  MUSCULOSKELETAL: Spine appears normal, no midline spinal tenderness, range of motion is not limited, no muscle or joint tenderness. no bony tenderness. no JVD, peripheral edema. L elbow deformity, NVI  NEUROLOGICAL: Alert and oriented, no focal deficits, no motor or sensory deficits.  SKIN: Skin normal color for race, warm, dry and intact. No evidence of rash.  PSYCHIATRIC: Alert and oriented to person, place, time/situation. normal mood and affect. no apparent risk to self or others.

## 2019-09-21 NOTE — ED PROVIDER NOTE - OBJECTIVE STATEMENT
89 yo hx of CAD, on elaquis with complaints of fall today, states she lost balance and fell on L side w elbow pain, cannot remember if hit head. 89 yo hx of CAD, on elaquis with complaints of fall today, states she lost balance and fell on L side w elbow pain, cannot remember if hit head. Pt denies neck, back pain, abd pain, other extremity injury. Pt has not tried anything for symptoms, no other aggravating or relieving factors.

## 2019-09-21 NOTE — ED PROVIDER NOTE - CLINICAL SUMMARY MEDICAL DECISION MAKING FREE TEXT BOX
S/p mechanical fall w L elbow pain, NVI, given age and on elaquis, will obtain CT head. Ortho requesting CT elbow prior to dc for outpt follow up.

## 2019-09-21 NOTE — ED ADULT NURSE NOTE - CHIEF COMPLAINT QUOTE
Mechanical fall, c/o of L elbow pain.  States she has been falling a lot lately, fell yesterday - chin noted to be bruised and R eyebrow bruise noted.  denies loc, dizziness, but states on blood thinner Eliquis.  Hx of recent stroke 2 weeks ago, no deficits.

## 2019-09-21 NOTE — CONSULT NOTE ADULT - SUBJECTIVE AND OBJECTIVE BOX
Pt Name: ROGER VALENTIN  MRN: [id number]    The patient was seen and examined. 89F RHD on eliquis who presents with L distal humerus fx s/p mechancial fall at home. Patient states that she had head trauma but no LOC, CT scan of head pending. Denies numbness/tingling in LUE.     ROS is otherwise negative.  PAST MEDICAL & SURGICAL HISTORY:  Stroke  CAD (coronary artery disease)  S/P CABG (coronary artery bypass graft)      Allergies: NKDA    Medications:      Social History:  Ambulation: Walking independently    PHYSICAL EXAM:    T(C): 36.3 (09-21-19 @ 17:25), Max: 36.3 (09-21-19 @ 17:25)  HR: 76 (09-21-19 @ 20:43) (16 - 82)  BP: 137/71 (09-21-19 @ 20:43) (93/59 - 137/71)  RR: 16 (09-21-19 @ 20:43) (16 - 16)  SpO2: 98% (09-21-19 @ 20:43) (95% - 98%)  Wt(kg): --    Gen: well developed, well nourished, comfortable  Affected extremity:   L elbow  no open fx  small hematoma 4x4cm on lateral epicondyle of humerus       Motor: firing AIN/PIN/ulnar      Sensation: SILT radial/median/ulnar nerve fields      wwp <2 sec cap refill     Labs:                        12.5   11.24 )-----------( 276      ( 21 Sep 2019 20:03 )             40.0     XR shows lateral condyle fx of humerus, Milch 2 classficiation  Post rduction with posteriro slab in adequate alignment           A/P  Pt is a 89yo Female s/p L distal humerus lateral condyle fx  -Keep splint DRY  -NWB LUE  -Pain contorl  -f/u with Dr. Rosa in 1 week   d/w attending on call Dr. Rosa

## 2019-09-21 NOTE — ED PROVIDER NOTE - PATIENT PORTAL LINK FT
You can access the FollowMyHealth Patient Portal offered by [facility] by registering at the following website: [email or web address]. By joining [facility]’s FollowMyHealth portal, you will also be able to view your health information using other applications (apps) compatible with our system.

## 2019-09-22 PROCEDURE — 73200 CT UPPER EXTREMITY W/O DYE: CPT | Mod: 26,LT

## 2019-09-22 PROCEDURE — 96376 TX/PRO/DX INJ SAME DRUG ADON: CPT | Mod: XU

## 2019-09-22 PROCEDURE — 70450 CT HEAD/BRAIN W/O DYE: CPT | Mod: 26

## 2019-09-22 PROCEDURE — 36415 COLL VENOUS BLD VENIPUNCTURE: CPT

## 2019-09-22 PROCEDURE — 96374 THER/PROPH/DIAG INJ IV PUSH: CPT | Mod: XU

## 2019-09-22 PROCEDURE — 73060 X-RAY EXAM OF HUMERUS: CPT

## 2019-09-22 PROCEDURE — 73030 X-RAY EXAM OF SHOULDER: CPT

## 2019-09-22 PROCEDURE — 85027 COMPLETE CBC AUTOMATED: CPT

## 2019-09-22 PROCEDURE — 81003 URINALYSIS AUTO W/O SCOPE: CPT

## 2019-09-22 PROCEDURE — 24530 CLTX SPRCNDYLR HUMERAL FX WO: CPT | Mod: LT,LT

## 2019-09-22 PROCEDURE — 73070 X-RAY EXAM OF ELBOW: CPT

## 2019-09-22 PROCEDURE — 93005 ELECTROCARDIOGRAM TRACING: CPT | Mod: XU

## 2019-09-22 PROCEDURE — 73200 CT UPPER EXTREMITY W/O DYE: CPT

## 2019-09-22 PROCEDURE — 99284 EMERGENCY DEPT VISIT MOD MDM: CPT | Mod: 25

## 2019-09-22 PROCEDURE — 70450 CT HEAD/BRAIN W/O DYE: CPT

## 2019-09-22 PROCEDURE — 73090 X-RAY EXAM OF FOREARM: CPT

## 2019-09-23 ENCOUNTER — OTHER (OUTPATIENT)
Age: 84
End: 2019-09-23

## 2019-09-23 NOTE — ED ADULT NURSE NOTE - CAS EDN DISCHARGE ASSESSMENT
Per Dr. Hilda tinsley to treat today with cbc results. Pt to receive 1uPRBC's today along with 500cc NS IV over 2 hours.    Alert and oriented to person, place and time

## 2019-09-26 ENCOUNTER — OTHER (OUTPATIENT)
Age: 84
End: 2019-09-26

## 2019-09-30 DIAGNOSIS — Z79.899 OTHER LONG TERM (CURRENT) DRUG THERAPY: ICD-10-CM

## 2019-09-30 DIAGNOSIS — I25.10 ATHEROSCLEROTIC HEART DISEASE OF NATIVE CORONARY ARTERY WITHOUT ANGINA PECTORIS: ICD-10-CM
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
## 2019-09-30 DIAGNOSIS — S42.412A DISPLACED SIMPLE SUPRACONDYLAR FRACTURE WITHOUT INTERCONDYLAR FRACTURE OF LEFT HUMERUS, INITIAL ENCOUNTER FOR CLOSED FRACTURE: ICD-10-CM
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
## 2019-09-30 DIAGNOSIS — M25.522 PAIN IN LEFT ELBOW: ICD-10-CM
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
## 2019-09-30 DIAGNOSIS — Z88.5 ALLERGY STATUS TO NARCOTIC AGENT: ICD-10-CM

## 2020-06-05 NOTE — PATIENT PROFILE ADULT - PACKS PER DAY
Pt. Requesting med refill of Lexapro.  Last script states no further refills until an appt. Made.  Advised pt. To F/U with PCP office when open to discuss med refill.  Pt verbalized understanding.    0

## 2020-08-07 NOTE — ED ADULT NURSE NOTE - ADDITIONAL COMPLAINTS
Patient came back into unit from cath lab, accompanied by nurse transported per stretcher. Placed comfortably in bed. Vitals taken. Hooked back to heparin drip. No c/o pain or discomfort. Left foot dressing changed. For APTT draw tomorrow morning, orders placed. Call light within reach. Will continue to monitor.    Additional Complaints

## 2020-09-26 NOTE — ED ADULT TRIAGE NOTE - CHIEF COMPLAINT QUOTE
Mechanical fall, c/o of L elbow pain.  States she has been falling a lot lately, fell yesterday - chin noted to be bruised and R eyebrow bruise noted.  denies loc, dizziness, but states on blood thinner Eliquis.  Hx of recent stroke 2 weeks ago, no deficits. Unknown at this time

## 2021-05-29 NOTE — STROKE CODE NOTE - NIH STROKE SCALE: 8. SENSORY, QM
I have personally evaluated and examined the patient. The Attending was available to me as a supervising provider if needed. (1) Mild-to-moderate sensory loss; patient feels pinprick is less sharp or is dull on the affected side; or there is a loss of superficial pain with pinprick, but patient is aware of being touched

## 2021-10-28 NOTE — SPEECH LANGUAGE PATHOLOGY EVALUATION - SLP DIAGNOSIS
Pt presents with mild receptive language deficits characterized by delayed processing for higher level syntax and reduced comprehension at the sentence level. Word-finding skills appear to have resolved. Speech is now fluent. Pt would benefit from skilled SLP tx as an outpt to address the aforementioned deficits.
- - -

## 2022-08-02 NOTE — PATIENT PROFILE ADULT - NSPROHMGISYMPCOND_GEN_A_NUR
Head,  normocephalic,  atraumatic,  Face,  Face within normal limits,  Ears,  External ears within normal limits,  Nose/Nasopharynx,  External nose  normal appearance,  nares patent,  no nasal discharge,  Mouth and Throat,  Oral cavity appearance normal,  Breath odor normal,  Lips,  Appearance normal
reflux/heartburn

## 2024-02-28 NOTE — ED PROVIDER NOTE - NSCAREINITIATED _GEN_ER
----- Message from Arabella Monroy PA-C sent at 2/28/2024  2:55 PM CST -----  Chlamydia is positive a prescription for Zithromax 1 g total x1 day sent to your pharmacy.  Your partner would also need to be treated if they do not have a care provider a prescription can be provided for them.  Abstain from sexual activity for 7 days after you and partner have been treated with the antibiotics.  Follow-up in 3 months for repeat chlamydia testing.  Refer to CDC guidelines for further information on chlamydia.    Call her and review details of the Chlamydia and to see if partner needed to be treated.  Report chlamydia to health department    Chasity Charles(Attending)

## 2024-03-05 NOTE — OCCUPATIONAL THERAPY INITIAL EVALUATION ADULT - LEVEL OF INDEPENDENCE: DRESS LOWER BODY, OT EVAL
Called pt informing appt on 3/25 needs to be rescheduled due to provider being out on currently scheduled appointment date. Patient requested refill for Tramadol be sent in prior to rescheduled appt. Rescheduled appt to 4/8 and sent in refill request. Patient verbalized understanding.   independent

## 2024-04-21 NOTE — ED ADULT TRIAGE NOTE - MEANS OF ARRIVAL
NEPHROLOGY/RENAL follow up PROGRESS NOTE     Patient: Mehreen Charles MRN # [id number]   : 1959 Attending: Vadim Leone MD   65 year old female Date of admission 2024    Chief complaint:NICO of decompensated CHF on  CKD III-IV vs. Progression of CKD    Subjective/Interval History: Pt's family interpreted per pt request.  No new complaints.  Denies orthopnea, SOB., CP, N/V.  Appetite is good. On  antibiotic for UTI.  Denies urinary symptoms.    Visit Vitals  /70   Pulse 87   Temp 98.6 °F (37 °C) (Oral)   Resp 16   Ht 5' (1.524 m)   Wt 56.1 kg (123 lb 10.9 oz)   SpO2 99%   BMI 24.15 kg/m²     Weight    24 0524 24 1100 24 0614 24 0943   Weight: 59.2 kg (130 lb 8.2 oz) 55.7 kg (122 lb 12.8 oz) 61.5 kg (135 lb 9.3 oz) 56.1 kg (123 lb 10.9 oz)      No results found for: \"EF\"      Consult HPI 24: This is a 65 year old F with PMHx as noted below including CKD III, DM II, HTN who presented to the ED with worsening LE edema over the last month. Did go to PCPs office and was started on lasix. Started lasix this morning. Came due to persisting and worsening symptoms. Nephrology consulted for NICO and severe hyponatremia.     Reports decreasing urine output for the last 5 days with associated nausea and decrease appetite since yesterday  Decreased insulin needs recently due to low blood sugars  Follows with Dr. Charles for management of CKD.  Baseline Cr unclear. Cr has been uptrending since . Had been around 1.5-1.7 mg/dL in . Up to 2.3 mg/dL in 3/2023, 2.8mg/dL in   and Feb of this year. Last Cr PTA was 3.3mg/dL on 3/26/24. On admission Cr up to 5.2 mg/dL. No recent kidney imaging.      Review of Systems:   All systems reviewed and were negative except as noted above    Reviewed:  meds/labs  & all data below with other service notes.     Vital Last Value 24Hour Range   Temperature 98.6 °F (37 °C) Temp  Min: 98.2 °F (36.8 °C)  Max: 98.6 °F (37 °C)   Pulse 87 Pulse   Min: 87  Max: 97   Respiratory 16 Resp  Min: 16  Max: 18   Blood Pressure 138/70 BP  Min: 105/58  Max: 138/70   ArtBP   No data recorded   Pulse Oximetry 99 % SpO2  Min: 97 %  Max: 99 %     Vital Today Admitted   Weight 56.1 kg (123 lb 10.9 oz) Weight: 61 kg (134 lb 7.7 oz)   Height N/A Height: 5' (152.4 cm)   BMI N/A BMI (Calculated): 26.26     Intake/Output:     Intake/Output Summary (Last 24 hours) at 4/21/2024 1642  Last data filed at 4/21/2024 1424  Gross per 24 hour   Intake 510 ml   Output --   Net 510 ml     Medications Prior to Admission   Medication Sig Dispense Refill    furosemide (LASIX) 40 MG tablet Take 1 tablet by mouth daily. 90 tablet 0    NIFEdipine XL (PROCARDIA XL) 30 MG 24 hr tablet Take 1 Tablet by mouth once daily for 90 days Take with 60mg for 90mg total daily dose. 90 tablet 0    hydrALAZINE (APRESOLINE) 25 MG tablet Take 1 tablet by mouth 3 times daily. 270 tablet 0    cefdinir (OMNICEF) 300 MG capsule Take 1 capsule by mouth once daily for 10 days. 10 capsule 0    clotrimazole (LOTRIMIN) 1 % vaginal cream Place 1 applicator vaginally nightly at bedtime for 7 days. 45 g 0    lactulose (CHRONULAC) 10 GM/15ML solution Take 15 mLs by mouth once daily as needed for constipation or  higher potassium on blood tests. 237 mL 0    Insulin Glargine Solostar 100 UNIT/ML Solution Pen-injector Inject 18 Units into the skin at bedtime. (Patient taking differently: Inject 10 Units into the skin at bedtime.) 18 mL 3    Insulin Pen Needle 31G X 8 MM Misc Use to inject insulin 2 times daily. 200 each 3    TRUEplus Lancets 28G Misc Check blood sugar twice daily. 300 each 1    blood glucose ([employer] Blood Glucose Test) test strip Check blood sugar twice daily. 300 each 1    hydrOXYzine (ATARAX) 25 MG tablet Take 1 tablet by mouth 3 times daily as needed for anxiety or sleep. 30 tablet 0    polyethylene glycol (MIRALAX) 17 GM/SCOOP powder Take 17 g mixed in water by mouth 2 times daily as needed for  constipation. May decrease to once daily when having normal stools. 1530 g 3    Insulin Pen Needle (Fifty50 Pen Needles) 31G X 8 MM Misc Use to inject insulin 3 times daily. 300 each 3       ALLERGIES:  No Known Allergies    Medications/Infusions: Scheduled  Current Facility-Administered Medications   Medication Dose Route Frequency Provider Last Rate Last Admin    insulin glargine (LANTUS) injection 7 Units  7 Units Subcutaneous Nightly Geovani Melendez DO        Potassium Standard Replacement Protocol (Levels 3.5 and lower)   Does not apply See Admin Instructions Geovani Melendez DO        Magnesium Standard Replacement Protocol   Does not apply See Admin Instructions Geovani Melendez DO        amoxicillin-clavulanate (AUGMENTIN) 250-125 MG per tablet 2 tablet  2 tablet Oral Daily Geovani Melendez DO   2 tablet at 04/21/24 0921    Phosphorus Standard Replacement Protocol   Does not apply See Admin Instructions Nani Pugh DO        NIFEdipine XL (PROCARDIA XL) ER tablet 60 mg  60 mg Oral Daily Jyotsna Garcia MD   60 mg at 04/21/24 0921    [Held by provider] furosemide (LASIX) tablet 80 mg  80 mg Oral BID John Bueno MD   80 mg at 04/20/24 0834    sodium chloride 0.9 % injection 2 mL  2 mL Intracatheter 2 times per day Goldberg, Aaron I, MD   2 mL at 04/21/24 0923    [Held by provider] heparin (porcine) injection 5,000 Units  5,000 Units Subcutaneous 3 times per day Rose Scruggs DO   5,000 Units at 04/19/24 0504    polyethylene glycol (MIRALAX) packet 17 g  17 g Oral Daily Rose Scruggs DO   17 g at 04/21/24 0921    insulin lispro (ADMELOG,HumaLOG) - Correction Dose   Subcutaneous TID WC Marcus Guaman DO   3 Units at 04/20/24 1325    hydrALAZINE (APRESOLINE) tablet 25 mg  25 mg Oral TID Alondra Hines MD   25 mg at 04/21/24 1442     Continuous Infusions:  Current Facility-Administered Medications   Medication Dose Route Frequency Provider Last Rate Last Admin       Physical Exam:  Visit Vitals  BP  138/70   Pulse 87   Temp 98.6 °F (37 °C) (Oral)   Resp 16   Ht 5' (1.524 m)   Wt 56.1 kg (123 lb 10.9 oz)   SpO2 99%   BMI 24.15 kg/m²     GENERAL: NAD at rest, comfortably flat  HEENT: Mucosa moist, Pallor+  NECK: No JVD  PULM: CTA, no IWOB on RA  CVS: RRR, S1S2, no S3,   Abd: Soft, NT, ND  EXT:  No edema  SKIN: Turgor good  NEURO:A & O x3, no new deficits, moves all 4  PSYCH: calm/cooperative. Normal affect      Laboratory Results:  Recent Labs   Lab 04/21/24  0457 04/20/24  0446 04/19/24  0912 04/18/24  1154 04/18/24  0536 04/17/24  2347 04/17/24  1750 04/17/24  1245 04/17/24  1236   GLUCOSE 85 103* 186*  --  58*  --   --   --  260*   SODIUM 128* 129* 129* 129* 128* 125* 126*  --  122*   POTASSIUM 4.1 3.3* 3.8  --  3.7  --   --   --  4.8   CHLORIDE 91* 90* 92*  --  93*  --   --   --  89*   CO2 27 27 23  --  26  --   --   --  22   * 98* 97*  --  87*  --   --   --  84*   CREATININE 5.09* 4.53* 4.08*  --  4.08*  --   --  5.20* 4.45*   ANIONGAP 14 15 18  --  13  --   --   --  16   BILIRUBIN 0.3 0.3 0.3  --  0.2  --   --   --  0.4   AST 42* 31 31  --  23  --   --   --  29   GPT 29 23 21  --  20  --   --   --  22   ALKPT 58 65 75  --  65  --   --   --  78   ALBUMIN 2.9* 3.1* 3.4*  --  2.7*  --   --   --  3.1*   CALCIUM 8.9 9.0 9.3  --  8.4  --   --   --  8.3*     Glomerular Filtration Rate (no units)   Date Value   04/21/2024 9 (L)       Recent Labs   Lab 04/21/24  0457 04/20/24  0446 04/19/24  0912 04/18/24  0536 04/17/24  1236   CALCIUM 8.9 9.0 9.3 8.4 8.3*     Recent Labs   Lab 04/21/24  0457 04/20/24  0446 04/19/24  0912 04/18/24  0536 04/17/24  1236   WBC 5.7 6.9 5.3 5.4 5.6   HGB 9.2* 9.5* 11.3* 9.6* 10.3*   HCT 26.5* 27.6* 32.7* 27.2* 29.4*    289 289 262 275       Iron Panel  No results found    Urinalysis  Recent Labs   Lab 04/17/24  1241   USPG 1.006   UPROT 100*   UWBC Moderate*   URBC Negative   UNITR Negative   UBILI Negative   UPH 5.5   UROB 0.2       Microbiology:  reviewed      Radiology:  US KIDNEYS AND BLADDER COMPLETE URINARY SYSTEM   Final Result   IMPRESSION:      1.  There is a simple appearing right renal cyst. No hydronephrosis or   nephrolithiasis bilaterally.   2.  The bilateral ureter jets are not visualized.         I, Attending Radiologist Esteban Huddleston MD, have reviewed the images and   report and concur with these findings interpreted by Resident Radiologist,   Husam Mckeon MD.      Electronically Signed by: Esteban Huddleston MD   Signed on: 4/17/2024 4:29 PM   Created on Workstation ID: [id number]   Signed on Workstation ID: [id number]      XR CHEST PA OR AP 1 VIEW   Final Result   IMPRESSION: No acute chest finding         Electronically Signed by: Byron Holland MD   Signed on: 4/17/2024 2:05 PM   Created on Workstation ID: [id number]   Signed on Workstation ID: [id number]            Echo Ejection Fraction: No results found for: \"EF\"    Diagnosis/Plan:      # NICO versus progression of CKD III of DN  UA s/o UTI - culture NGTD, not on SGLT2i PTA  Renal US without obstruction     # CKD III - nephrotic range proteinuria  5.49 g on UPCR 3/26/24; not on SGLT2i PTA  Cr uptrending since 2022,   Last Cr PTA 3.3 mg/dL on 3/26/24     # Hyponatremia - hypervolemic     # HTN      # Hypervolemia - improved     # DM II with diabetic nephropathy     # Anemia - mild    #Hypokalemia    RECS:  Cr increased today  Continue to Hold Lasix  PO fluid restriction  may Rx SGLT2i if GFR stabilizes and improves (unlikely yet hopeful)  Reviewed renal US - no obstruction  No urgent indications for RRT - for now continue to monitor closely, no margy Uremic sx  Labs in AM.    # Monitor daily weight, I & Os closely  # Avoid hypotension and hypovolemia. Optimize volume status.  # Avoid ACEi/ARBs until NICO resolves even if hypertensive, choose alternative Rx.  # Avoid NSAIDs/MICHAEL-2 inhibitors, all nephrotoxins, and PPIs (use H2 blockers instead).  # Monitor nephrotoxic drug levels as  appropriate.  # Avoid radiocontrast studies unless absolutely necessary  # Dose adjust meds to age and GFR    MD Scotty             wheelchair

## 2024-05-09 NOTE — ED ADULT NURSE NOTE - CHIEF COMPLAINT
[facility] MEDICAL ONCOLOGY  [address] [address]  Dept: [phone]  Loc: [phone]  Attending progress note      Reason for Visit: Iron deficiency.    Referring Physician:  NADIR Beltre CNP    PCP:  Kamari Miles APRN - CNP    History of Present Illness:      Mrs. De La O is a pleasant 44-year-old lady, with a past medical history significant for asthma, anxiety and depression, DM, GERD, CKD, hyperlipidemia, hypertension, and fibromyalgia, who was referred to the hematology office for evaluation of iron deficiency that was not responsive to the oral iron.  The patient's hemoglobin hematocrit are normal, but she has persistent iron deficiency, she had blood work done with Dr. Galvan, was told she needed parenteral iron infusion.  She had GI side effects with the oral iron.  The patient received the first dose of Feraheme on 1/6/2022, she had nausea for 2 days after receiving the infusion.  She received Zofran with the second dose.     The patient returns the office for a follow-up visit, she is following with podiatry, she has advanced calcaneal osteotomy and repair, and tibial tendon repair.  She has not undergone stress due to insurance issues.    Review of Systems;  CONSTITUTIONAL: No fever, chills. Good appetite, and energy level.  ENMT: Eyes: No diplopia; Nose: No epistaxis. Mouth: No sore throat.  RESPIRATORY: No hemoptysis, shortness of breath, cough.   CARDIOVASCULAR: No chest pain, palpitations.  GASTROINTESTINAL: Positive for nausea, no abdominal pain, diarrhea/constipation.  GENITOURINARY: No dysuria, urinary frequency, hematuria.  NEURO: No syncope, presyncope, pos for headache.   Remainder:  ROS NEGATIVE    Past Medical History:      Diagnosis Date    Anxiety and depression     Arthritis     Asthma     Bulging lumbar disc     Cervical disc disease     Diabetes mellitus (HCC)     diet controlled     The patient is a 89y Female complaining of difficulty speaking.

## 2025-03-07 NOTE — ED ADULT NURSE NOTE - EENT WDL
Eyes with no visual disturbances.  Ears clean and dry and no hearing difficulties. Nose with pink mucosa and no drainage.  Mouth mucous membranes moist and pink.  No tenderness or swelling to throat or neck. 165.1